# Patient Record
Sex: MALE | Race: WHITE | NOT HISPANIC OR LATINO | Employment: OTHER | URBAN - METROPOLITAN AREA
[De-identification: names, ages, dates, MRNs, and addresses within clinical notes are randomized per-mention and may not be internally consistent; named-entity substitution may affect disease eponyms.]

---

## 2023-08-14 ENCOUNTER — HOSPITAL ENCOUNTER (OUTPATIENT)
Dept: DATA CONVERSION | Facility: HOSPITAL | Age: 68
Discharge: HOME | End: 2023-08-14

## 2023-08-14 DIAGNOSIS — E78.5 HYPERLIPIDEMIA, UNSPECIFIED: ICD-10-CM

## 2023-08-14 LAB
APPEARANCE PLAS: CLEAR
CHOLEST SERPL-MCNC: 124 MG/DL (ref 133–200)
CHOLEST/HDLC SERPL: 3.4 RATIO
COLOR SPUN FLD: YELLOW
FASTING STATUS PATIENT QL REPORTED: ABNORMAL
HDLC SERPL-MCNC: 36 MG/DL
LDLC SERPL CALC-MCNC: 61 MG/DL (ref 65–130)
TRIGL SERPL-MCNC: 135 MG/DL (ref 40–150)

## 2023-09-16 PROBLEM — E78.5 HYPERLIPEMIA: Status: ACTIVE | Noted: 2023-09-16

## 2023-09-16 PROBLEM — I10 HTN (HYPERTENSION): Status: ACTIVE | Noted: 2023-09-16

## 2023-09-16 PROBLEM — I25.10 ASHD (ARTERIOSCLEROTIC HEART DISEASE): Status: ACTIVE | Noted: 2023-09-16

## 2023-09-16 RX ORDER — NITROGLYCERIN 0.4 MG/1
1 TABLET SUBLINGUAL EVERY 5 MIN PRN
COMMUNITY

## 2023-09-16 RX ORDER — ALLOPURINOL 300 MG/1
1 TABLET ORAL DAILY
COMMUNITY
End: 2023-11-23

## 2023-09-16 RX ORDER — ASCORBIC ACID 250 MG
1 TABLET,CHEWABLE ORAL DAILY
COMMUNITY

## 2023-09-16 RX ORDER — LEVOTHYROXINE SODIUM 100 UG/1
1 TABLET ORAL EVERY MORNING
COMMUNITY
End: 2023-11-23

## 2023-09-16 RX ORDER — METOPROLOL TARTRATE 25 MG/1
1 TABLET, FILM COATED ORAL 2 TIMES DAILY
COMMUNITY
End: 2023-11-02

## 2023-09-16 RX ORDER — NAPROXEN SODIUM 220 MG/1
1 TABLET, FILM COATED ORAL DAILY
COMMUNITY
Start: 2011-10-13

## 2023-09-16 RX ORDER — ATORVASTATIN CALCIUM 80 MG/1
1 TABLET, FILM COATED ORAL DAILY
COMMUNITY
End: 2023-12-22

## 2023-09-16 RX ORDER — VIT C/E/ZN/COPPR/LUTEIN/ZEAXAN 250MG-90MG
2 CAPSULE ORAL DAILY
COMMUNITY

## 2023-10-30 DIAGNOSIS — I10 BENIGN ESSENTIAL HYPERTENSION: Primary | ICD-10-CM

## 2023-11-02 RX ORDER — METOPROLOL TARTRATE 25 MG/1
25 TABLET, FILM COATED ORAL 2 TIMES DAILY
Qty: 180 TABLET | Refills: 0 | Status: SHIPPED | OUTPATIENT
Start: 2023-11-02 | End: 2024-02-12

## 2023-12-21 DIAGNOSIS — E78.2 MIXED HYPERLIPIDEMIA: Primary | ICD-10-CM

## 2023-12-22 RX ORDER — ATORVASTATIN CALCIUM 80 MG/1
80 TABLET, FILM COATED ORAL DAILY
Qty: 90 TABLET | Refills: 3 | Status: SHIPPED | OUTPATIENT
Start: 2023-12-22

## 2023-12-23 ENCOUNTER — TELEMEDICINE (OUTPATIENT)
Dept: PRIMARY CARE | Facility: CLINIC | Age: 68
End: 2023-12-23
Payer: MEDICARE

## 2023-12-23 DIAGNOSIS — J01.00 ACUTE MAXILLARY SINUSITIS, RECURRENCE NOT SPECIFIED: Primary | ICD-10-CM

## 2023-12-23 DIAGNOSIS — R53.83 OTHER FATIGUE: ICD-10-CM

## 2023-12-23 DIAGNOSIS — Z00.00 HEALTH CARE MAINTENANCE: Primary | ICD-10-CM

## 2023-12-23 DIAGNOSIS — R05.2 SUBACUTE COUGH: ICD-10-CM

## 2023-12-23 PROCEDURE — 99442 PR PHYS/QHP TELEPHONE EVALUATION 11-20 MIN: CPT | Performed by: INTERNAL MEDICINE

## 2023-12-23 RX ORDER — AZITHROMYCIN 250 MG/1
TABLET, FILM COATED ORAL
Qty: 6 TABLET | Refills: 0 | Status: SHIPPED | OUTPATIENT
Start: 2023-12-23 | End: 2023-12-28

## 2023-12-23 NOTE — PROGRESS NOTES
Subjective   Patient ID: Trent Sandhu is a 68 y.o. male who presents for No chief complaint on file..    HPI patient initiated telehealth visit this visit was completed via telephone secondary to the restrictions of the COVID-19 pandemic all medical issues were addressed and discussed with patient patient was not otherwise examined if it was felt that the patient needed to be seen in the office they would have been referred to do so patient verbally consented to visit  Sick visit has been ill for several days with intense sinus pressure drainage headache fatigue no chest pain no shortness of breath has tried multiple over-the-counter products without success    Review of Systems    Objective   There were no vitals taken for this visit.    Physical Exam alert and oriented x 3 breathing unlabored not otherwise examined    Assessment/Plan impression acute maxillary sinusitis cough fatigue  Plan discussed with pneumonia vaccination discussed with RSV vaccination good diet regular exercise increase water consumption proper rest for fatigue Tylenol as needed for any body aches and pains Mucinex DM or Coricidin for the sinus and cough then will send prescription for Zithromax 250 mg tablet take 2 tablets first day 1 for each of the next 4 days #1 traditional Z-Mirza and 0 refills continue with other medications recheck if no better and for regular visit

## 2023-12-31 NOTE — PROGRESS NOTES
Leandro Puckett MD  Physician  Primary Care     Progress Notes      Signed     Creation Time: 12/23/2023 10:33 AM     Signed       Expand All Collapse All       Subjective   Patient ID: Trent Sandhu is a 68 y.o. male who presents for No chief complaint on file..     HPI patient initiated telehealth visit this visit was completed via telephone secondary to the restrictions of the COVID-19 pandemic all medical issues were addressed and discussed with patient patient was not otherwise examined if it was felt that the patient needed to be seen in the office they would have been referred to do so patient verbally consented to visit  Sick visit has been ill for several days with intense sinus pressure drainage headache fatigue no chest pain no shortness of breath has tried multiple over-the-counter products without success     Review of Systems           Objective   There were no vitals taken for this visit.     Physical Exam alert and oriented x 3 breathing unlabored not otherwise examined           Assessment/Plan   impression acute maxillary sinusitis cough fatigue  Plan discussed with pneumonia vaccination discussed with RSV vaccination good diet regular exercise increase water consumption proper rest for fatigue Tylenol as needed for any body aches and pains Mucinex DM or Coricidin for the sinus and cough then will send prescription for Zithromax 250 mg tablet take 2 tablets first day 1 for each of the next 4 days #1 traditional Z-Mirza and 0 refills continue with other medications recheck if no better and for regular visit                        Electronically signed by Leandro Puckett MD at 12/31/2023  1:02 AM         Orders Only on 12/23/2023          Note shared with patient  Additional Documentation    Encounter Info: Billing Info,     History,     Allergies     Orders Placed    None  Medication Changes      azithromycin 250 mg Take 2 tablets (500 mg) by mouth once daily for 1 day, THEN 1 tablet (250 mg)  once daily for 4 days. Take 2 tabs (500 mg) by mouth today, than 1 daily for 4 days..  Medication List  Visit Diagnoses      Health care maintenance  Problem List

## 2024-01-19 DIAGNOSIS — Z00.00 HEALTH CARE MAINTENANCE: Primary | ICD-10-CM

## 2024-01-19 RX ORDER — AMOXICILLIN 875 MG/1
875 TABLET, FILM COATED ORAL 2 TIMES DAILY
Qty: 20 TABLET | Refills: 0 | Status: SHIPPED | OUTPATIENT
Start: 2024-01-19 | End: 2024-01-29

## 2024-01-30 ENCOUNTER — LAB (OUTPATIENT)
Dept: LAB | Facility: LAB | Age: 69
End: 2024-01-30
Payer: MEDICARE

## 2024-01-30 DIAGNOSIS — I10 ESSENTIAL (PRIMARY) HYPERTENSION: Primary | ICD-10-CM

## 2024-01-30 LAB
ANION GAP SERPL CALC-SCNC: 11 MMOL/L (ref 10–20)
BUN SERPL-MCNC: 26 MG/DL (ref 6–23)
CALCIUM SERPL-MCNC: 9 MG/DL (ref 8.6–10.3)
CHLORIDE SERPL-SCNC: 105 MMOL/L (ref 98–107)
CO2 SERPL-SCNC: 29 MMOL/L (ref 21–32)
CREAT SERPL-MCNC: 1.36 MG/DL (ref 0.5–1.3)
EGFRCR SERPLBLD CKD-EPI 2021: 56 ML/MIN/1.73M*2
GLUCOSE SERPL-MCNC: 114 MG/DL (ref 74–99)
POTASSIUM SERPL-SCNC: 3.9 MMOL/L (ref 3.5–5.3)
SODIUM SERPL-SCNC: 141 MMOL/L (ref 136–145)

## 2024-01-30 PROCEDURE — 80048 BASIC METABOLIC PNL TOTAL CA: CPT

## 2024-01-30 PROCEDURE — 36415 COLL VENOUS BLD VENIPUNCTURE: CPT

## 2024-02-12 ENCOUNTER — OFFICE VISIT (OUTPATIENT)
Dept: CARDIOLOGY | Facility: CLINIC | Age: 69
End: 2024-02-12
Payer: MEDICARE

## 2024-02-12 VITALS
DIASTOLIC BLOOD PRESSURE: 62 MMHG | BODY MASS INDEX: 24.51 KG/M2 | HEART RATE: 70 BPM | WEIGHT: 166 LBS | SYSTOLIC BLOOD PRESSURE: 132 MMHG

## 2024-02-12 DIAGNOSIS — E78.2 MIXED HYPERLIPIDEMIA: ICD-10-CM

## 2024-02-12 DIAGNOSIS — I25.10 ASHD (ARTERIOSCLEROTIC HEART DISEASE): Primary | ICD-10-CM

## 2024-02-12 DIAGNOSIS — I10 PRIMARY HYPERTENSION: ICD-10-CM

## 2024-02-12 PROCEDURE — 3078F DIAST BP <80 MM HG: CPT | Performed by: INTERNAL MEDICINE

## 2024-02-12 PROCEDURE — 99214 OFFICE O/P EST MOD 30 MIN: CPT | Mod: 25,27 | Performed by: INTERNAL MEDICINE

## 2024-02-12 PROCEDURE — 1159F MED LIST DOCD IN RCRD: CPT | Performed by: INTERNAL MEDICINE

## 2024-02-12 PROCEDURE — 1036F TOBACCO NON-USER: CPT | Performed by: INTERNAL MEDICINE

## 2024-02-12 PROCEDURE — 3075F SYST BP GE 130 - 139MM HG: CPT | Performed by: INTERNAL MEDICINE

## 2024-02-12 PROCEDURE — 1126F AMNT PAIN NOTED NONE PRSNT: CPT | Performed by: INTERNAL MEDICINE

## 2024-02-12 PROCEDURE — 99214 OFFICE O/P EST MOD 30 MIN: CPT | Performed by: INTERNAL MEDICINE

## 2024-02-12 RX ORDER — AMLODIPINE BESYLATE 5 MG/1
5 TABLET ORAL DAILY
Qty: 90 TABLET | Refills: 3 | Status: SHIPPED | OUTPATIENT
Start: 2024-02-12 | End: 2024-02-19 | Stop reason: SDUPTHER

## 2024-02-12 ASSESSMENT — ENCOUNTER SYMPTOMS
NUMBNESS: 0
ABDOMINAL PAIN: 0
COUGH: 0
DYSURIA: 0
OCCASIONAL FEELINGS OF UNSTEADINESS: 0
PARESTHESIAS: 0
BLURRED VISION: 0
DYSPNEA ON EXERTION: 0
PALPITATIONS: 0
DEPRESSION: 0
SHORTNESS OF BREATH: 0
LOSS OF SENSATION IN FEET: 0
HEMATURIA: 0

## 2024-02-12 ASSESSMENT — PAIN SCALES - GENERAL: PAINLEVEL: 0-NO PAIN

## 2024-02-12 NOTE — PROGRESS NOTES
Subjective   Trent Sandhu is a 69 y.o. male.    Chief Complaint:  No chief complaint on file.    HPI  Clinically patient doing very well with no chest pain or anginal symptoms.  Remains physically active walking almost 3 miles per day.    Review of Systems   Constitutional: Negative for malaise/fatigue.   HENT:  Negative for congestion.    Eyes:  Negative for blurred vision.   Cardiovascular:  Negative for chest pain, dyspnea on exertion and palpitations.   Respiratory:  Negative for cough and shortness of breath.    Musculoskeletal:  Negative for joint pain.   Gastrointestinal:  Negative for abdominal pain.   Genitourinary:  Negative for dysuria and hematuria.   Neurological:  Negative for numbness and paresthesias.       Objective   Constitutional:       Appearance: Not in distress.   Eyes:      Conjunctiva/sclera: Conjunctivae normal.   Neck:      Vascular: JVD normal.   Pulmonary:      Breath sounds: Normal breath sounds. No wheezing. No rhonchi. No rales.   Cardiovascular:      Normal rate. Regular rhythm.      Murmurs: There is no murmur.      No gallop.  No click. No rub.   Abdominal:      Palpations: Abdomen is soft.   Neurological:      General: No focal deficit present.      Mental Status: Alert.         Lab Review:   Lab on 01/30/2024   Component Date Value    Glucose 01/30/2024 114 (H)     Sodium 01/30/2024 141     Potassium 01/30/2024 3.9     Chloride 01/30/2024 105     Bicarbonate 01/30/2024 29     Anion Gap 01/30/2024 11     Urea Nitrogen 01/30/2024 26 (H)     Creatinine 01/30/2024 1.36 (H)     eGFR 01/30/2024 56 (L)     Calcium 01/30/2024 9.0        Assessment/Plan   The primary encounter diagnosis was ASHD (arteriosclerotic heart disease). Diagnoses of Primary hypertension and Mixed hyperlipidemia were also pertinent to this visit.    Hyperlipemia  Last LDL was 61, very good.  Plan on repeating lipid panel on return.    HTN (hypertension)  Reviewed BMP from January:  K+ 3.9 Creat 1.36 (GFR 56)  Glucose 114    ASHD (arteriosclerotic heart disease)  Stable with no anginal symptoms, will follow clinically, doing well.

## 2024-02-19 ENCOUNTER — OFFICE VISIT (OUTPATIENT)
Dept: PRIMARY CARE | Facility: CLINIC | Age: 69
End: 2024-02-19
Payer: MEDICARE

## 2024-02-19 ENCOUNTER — TELEPHONE (OUTPATIENT)
Dept: PRIMARY CARE | Facility: CLINIC | Age: 69
End: 2024-02-19

## 2024-02-19 VITALS — SYSTOLIC BLOOD PRESSURE: 135 MMHG | DIASTOLIC BLOOD PRESSURE: 74 MMHG

## 2024-02-19 DIAGNOSIS — M10.9 GOUT, UNSPECIFIED CAUSE, UNSPECIFIED CHRONICITY, UNSPECIFIED SITE: ICD-10-CM

## 2024-02-19 DIAGNOSIS — J06.9 UPPER RESPIRATORY TRACT INFECTION, UNSPECIFIED TYPE: ICD-10-CM

## 2024-02-19 DIAGNOSIS — R73.02 GLUCOSE INTOLERANCE (IMPAIRED GLUCOSE TOLERANCE): ICD-10-CM

## 2024-02-19 DIAGNOSIS — I10 PRIMARY HYPERTENSION: ICD-10-CM

## 2024-02-19 DIAGNOSIS — I10 PRIMARY HYPERTENSION: Primary | ICD-10-CM

## 2024-02-19 PROCEDURE — 1036F TOBACCO NON-USER: CPT | Performed by: INTERNAL MEDICINE

## 2024-02-19 PROCEDURE — 1126F AMNT PAIN NOTED NONE PRSNT: CPT | Performed by: INTERNAL MEDICINE

## 2024-02-19 PROCEDURE — 3075F SYST BP GE 130 - 139MM HG: CPT | Performed by: INTERNAL MEDICINE

## 2024-02-19 PROCEDURE — 99214 OFFICE O/P EST MOD 30 MIN: CPT | Performed by: INTERNAL MEDICINE

## 2024-02-19 PROCEDURE — 1159F MED LIST DOCD IN RCRD: CPT | Performed by: INTERNAL MEDICINE

## 2024-02-19 PROCEDURE — 3078F DIAST BP <80 MM HG: CPT | Performed by: INTERNAL MEDICINE

## 2024-02-19 RX ORDER — AMLODIPINE BESYLATE 5 MG/1
5 TABLET ORAL DAILY
Qty: 90 TABLET | Refills: 3 | Status: SHIPPED | OUTPATIENT
Start: 2024-02-19 | End: 2024-06-03 | Stop reason: DRUGHIGH

## 2024-02-19 NOTE — PROGRESS NOTES
Subjective   Patient ID: Trent Sandhu is a 69 y.o. male who presents for No chief complaint on file..    HPI Follow-up visit and sick visit no chest pain or shortness of breath has been back-and-forth with some respiratory tract infections has been on several courses of antibiotics continues to be managed.  Needs pain shoulder  Recurrent fever beta-blocker changed to amlodipine.  His blood pressure he had some blood work done uric acid level we will go back to go off      The visit has not had gout glucose 114 creatinine 1.6 with Dr. Redman    Review of Systems    Objective   There were no vitals taken for this visit.    Physical Exam  Vital signs noted alert and oriented x 3 NCAT no coryza there is some erythema around the eye socket on the left prosthetic he is on drops and side of for that with the ophthalmologist nares without discharge OP benign TM normal bilateral EAC clear bilateral no AC nodes no JVD chest clear to auscultation CV regular rate and rhythm S1-S2 abdomen soft nontender normal active bowel sounds extremities no clubbing cyanosis or edema normal pulses  Assessment/Plan    impression hypertension glucose intolerance mild renal insufficiency continued upper respiratory tract type infection other diagnoses gout  Plan May go off the allopurinol should be show choose continue to watch diet for uric acid and serum allopurinol at any time or treat gout accordingly may recheck uric acid continue with good better water consumption reevaluate the blood sugar elevated creatinine after change of medications diet watching urinc acid and cho good exercise and water consumption follow-up with Dr. Ortega.  Cardiology for the upper respiratory tract infection with the use of Coricidin Tylenol as needed avoidance of NSAIDs no further antibiotics are needed and recheck for regular visit and based on above  tt40 cc21

## 2024-05-02 DIAGNOSIS — Z00.00 HEALTH CARE MAINTENANCE: ICD-10-CM

## 2024-05-02 RX ORDER — ALLOPURINOL 300 MG/1
300 TABLET ORAL DAILY
Qty: 90 TABLET | Refills: 0 | Status: SHIPPED | OUTPATIENT
Start: 2024-05-02

## 2024-05-02 RX ORDER — LEVOTHYROXINE SODIUM 100 UG/1
100 TABLET ORAL DAILY
Qty: 90 TABLET | Refills: 0 | Status: SHIPPED | OUTPATIENT
Start: 2024-05-02

## 2024-05-21 ENCOUNTER — OFFICE VISIT (OUTPATIENT)
Dept: PRIMARY CARE | Facility: CLINIC | Age: 69
End: 2024-05-21
Payer: MEDICARE

## 2024-05-21 VITALS — DIASTOLIC BLOOD PRESSURE: 71 MMHG | SYSTOLIC BLOOD PRESSURE: 131 MMHG

## 2024-05-21 DIAGNOSIS — N64.4 NIPPLE TENDERNESS: ICD-10-CM

## 2024-05-21 DIAGNOSIS — I10 PRIMARY HYPERTENSION: Primary | ICD-10-CM

## 2024-05-21 PROCEDURE — 99213 OFFICE O/P EST LOW 20 MIN: CPT | Performed by: INTERNAL MEDICINE

## 2024-05-21 PROCEDURE — 3075F SYST BP GE 130 - 139MM HG: CPT | Performed by: INTERNAL MEDICINE

## 2024-05-21 PROCEDURE — 3078F DIAST BP <80 MM HG: CPT | Performed by: INTERNAL MEDICINE

## 2024-05-21 PROCEDURE — 1159F MED LIST DOCD IN RCRD: CPT | Performed by: INTERNAL MEDICINE

## 2024-05-21 NOTE — PROGRESS NOTES
Subjective   Patient ID: Trent Sandhu is a 69 y.o. male who presents for No chief complaint on file..    HPI follow-up visit and sick visit no chest pain no shortness of breath no side effect with medication was changed to amlodipine from metoprolol with his cardiologist and just in the past few weeks that we noticed some nipple sensitivity without breast enhancement no other dietary or medication change both prescription and over-the-counter    Review of Systems    Objective   There were no vitals taken for this visit.    Physical Exam vital signs noted alert and oriented x 3 NCAT no lid lag no JVD no thyromegaly chest clear to auscultation no gynecomastia nipples appear to be normal no enhanced redness CV regular rate and rhythm S1-S2 extremities no clubbing cyanosis or edema normal distal pulses    Assessment/Plan impression hypertension nipple sensitivity  Plan he will wait 1 month and then recheck on testosterone or thyroid or run search on amlodipine as a potential cause and then follow-up with his cardiologist and refer to endocrinology if needed otherwise good diet regular exercise continue with blood pressure management and medication and follow-up for regular visit    Run search on amlodipine and gynecomastia or nipple sensitivity (?)

## 2024-05-24 ENCOUNTER — TELEPHONE (OUTPATIENT)
Dept: PRIMARY CARE | Facility: CLINIC | Age: 69
End: 2024-05-24

## 2024-05-24 DIAGNOSIS — N64.4 NIPPLE TENDERNESS: ICD-10-CM

## 2024-05-24 DIAGNOSIS — E03.9 HYPOTHYROIDISM, UNSPECIFIED TYPE: Primary | ICD-10-CM

## 2024-05-25 ENCOUNTER — LAB (OUTPATIENT)
Dept: LAB | Facility: LAB | Age: 69
End: 2024-05-25
Payer: MEDICARE

## 2024-05-25 DIAGNOSIS — E03.9 HYPOTHYROIDISM, UNSPECIFIED TYPE: ICD-10-CM

## 2024-05-25 DIAGNOSIS — N64.4 NIPPLE TENDERNESS: ICD-10-CM

## 2024-05-25 LAB — TSH SERPL-ACNC: 2.81 MIU/L (ref 0.44–3.98)

## 2024-05-25 PROCEDURE — 36415 COLL VENOUS BLD VENIPUNCTURE: CPT

## 2024-05-25 PROCEDURE — 84402 ASSAY OF FREE TESTOSTERONE: CPT

## 2024-05-25 PROCEDURE — 84443 ASSAY THYROID STIM HORMONE: CPT

## 2024-05-31 LAB
TESTOSTERONE FREE (CHAN): 46.3 PG/ML (ref 30–135)
TESTOSTERONE,TOTAL,LC-MS/MS: 445 NG/DL (ref 250–1100)

## 2024-06-03 ENCOUNTER — TELEPHONE (OUTPATIENT)
Dept: CARDIOLOGY | Facility: CLINIC | Age: 69
End: 2024-06-03
Payer: MEDICARE

## 2024-06-03 DIAGNOSIS — I10 PRIMARY HYPERTENSION: Primary | ICD-10-CM

## 2024-06-03 RX ORDER — AMLODIPINE BESYLATE 10 MG/1
10 TABLET ORAL DAILY
Qty: 90 TABLET | Refills: 3 | Status: SHIPPED | OUTPATIENT
Start: 2024-06-03 | End: 2025-06-03

## 2024-06-03 NOTE — PROGRESS NOTES
With higher pressures will increase the Amlodipine to 10 mg daily.  He has also purchased a new BP monitor, I asked him to bring it in on next visit.

## 2024-06-14 ENCOUNTER — HOSPITAL ENCOUNTER (OUTPATIENT)
Dept: RADIOLOGY | Facility: CLINIC | Age: 69
Discharge: HOME | End: 2024-06-14
Payer: MEDICARE

## 2024-06-14 ENCOUNTER — APPOINTMENT (OUTPATIENT)
Dept: PRIMARY CARE | Facility: CLINIC | Age: 69
End: 2024-06-14
Payer: MEDICARE

## 2024-06-14 VITALS — SYSTOLIC BLOOD PRESSURE: 140 MMHG | DIASTOLIC BLOOD PRESSURE: 75 MMHG

## 2024-06-14 DIAGNOSIS — R07.9 RIGHT-SIDED CHEST PAIN: Primary | ICD-10-CM

## 2024-06-14 DIAGNOSIS — E03.9 HYPOTHYROIDISM, UNSPECIFIED TYPE: ICD-10-CM

## 2024-06-14 DIAGNOSIS — R07.9 RIGHT-SIDED CHEST PAIN: ICD-10-CM

## 2024-06-14 PROCEDURE — 71046 X-RAY EXAM CHEST 2 VIEWS: CPT | Performed by: RADIOLOGY

## 2024-06-14 PROCEDURE — 99213 OFFICE O/P EST LOW 20 MIN: CPT | Performed by: INTERNAL MEDICINE

## 2024-06-14 PROCEDURE — 3077F SYST BP >= 140 MM HG: CPT | Performed by: INTERNAL MEDICINE

## 2024-06-14 PROCEDURE — 3078F DIAST BP <80 MM HG: CPT | Performed by: INTERNAL MEDICINE

## 2024-06-14 PROCEDURE — 71046 X-RAY EXAM CHEST 2 VIEWS: CPT

## 2024-06-14 NOTE — PROGRESS NOTES
Subjective   Patient ID: Trent Sandhu is a 69 y.o. male who presents for No chief complaint on file..    HPI follow-up visit no chest pain no shortness of breath had blood work regarding his right-sided at the time thought breast discomfort but really is chest wall discomfort mostly when he goes to give a hug and the chest wall is pressed happens whether it is his wife or grandchild has been lifting 5 gallon water jugs for exercise but does not fill them all the way to the top    Review of Systems    Objective   /75     Physical Exam vital signs noted alert and oriented x 3 NCAT no lid lag no thyromegaly no JVD or bruit chest clear to auscultation CV regular rate and rhythm S1-S2 chest wall nontender no visible markings normal nipple normal shoulder normal neck normal bicep normal tricep normal handgrip on that side extremities no clubbing cyanosis or edema normal distal pulses dtr 2+  Assessment/Plan impression chest wall pain on right other diagnoses hypothyroid  Plan blood pressure is okay on the amlodipine will obtain chest x-ray PA lateral requisition made whether pinched nerve or not and then advised on analgesia he will call back regarding his films and then review overall diet and exercise regarding blood pressure The testosterone level was reviewed and was normal the thyroid test was reviewed and was normal in May continue on the same dosing of the levothyroxine medication and follow-up based on above

## 2024-07-17 ENCOUNTER — OFFICE VISIT (OUTPATIENT)
Dept: PRIMARY CARE | Facility: CLINIC | Age: 69
End: 2024-07-17
Payer: MEDICARE

## 2024-07-17 VITALS — DIASTOLIC BLOOD PRESSURE: 76 MMHG | SYSTOLIC BLOOD PRESSURE: 123 MMHG

## 2024-07-17 DIAGNOSIS — R60.9 PERIPHERAL EDEMA: ICD-10-CM

## 2024-07-17 DIAGNOSIS — I10 PRIMARY HYPERTENSION: Primary | ICD-10-CM

## 2024-07-17 PROCEDURE — 3074F SYST BP LT 130 MM HG: CPT | Performed by: INTERNAL MEDICINE

## 2024-07-17 PROCEDURE — 3078F DIAST BP <80 MM HG: CPT | Performed by: INTERNAL MEDICINE

## 2024-07-17 PROCEDURE — 99213 OFFICE O/P EST LOW 20 MIN: CPT | Performed by: INTERNAL MEDICINE

## 2024-07-18 DIAGNOSIS — Z00.00 HEALTH CARE MAINTENANCE: ICD-10-CM

## 2024-07-18 RX ORDER — ALLOPURINOL 300 MG/1
300 TABLET ORAL DAILY
Qty: 90 TABLET | Refills: 0 | Status: SHIPPED | OUTPATIENT
Start: 2024-07-18

## 2024-07-18 RX ORDER — LEVOTHYROXINE SODIUM 100 UG/1
100 TABLET ORAL DAILY
Qty: 90 TABLET | Refills: 0 | Status: SHIPPED | OUTPATIENT
Start: 2024-07-18

## 2024-07-18 NOTE — PROGRESS NOTES
Subjective   Patient ID: Trent Sandhu is a 69 y.o. male who presents for No chief complaint on file..    HPI  Sick visit same day after hours no staff  No chest pain no shortness of breath no side effect with medication follow-up with  Some left leg slight swelling and foot. 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    Physical Exam vital signs noted alert oriented x 3 NCAT no JVD or bruit chest clear to auscultation CV regular rate and rhythm s1s2 s mgr  Extremities no clubbing cyanosis or edema on the right mild non pitting left with mild swelling tightness to the achilles she is not ankle 2 cm larger than right side intact distal pulses    Assessment/Plan  impression htn peripheral edema other diagnosis   Plan watch salt in diet elevate legs advil prn if needed for pain and/or swelling  Reviewed laboratory resuls (check)  Regular exercise  Good shoe wear  Continue with bp medicine  Follow up with cardiology as before  Recheck if not beter and for regular visit

## 2024-07-22 ENCOUNTER — TELEPHONE (OUTPATIENT)
Dept: CARDIOLOGY | Facility: CLINIC | Age: 69
End: 2024-07-22

## 2024-07-22 NOTE — TELEPHONE ENCOUNTER
Patient states he has had swelling in his ankles over the weekend left greater than right.  Better today but unusual for him.  He states he is taking amlodipine unknown milligram strength twice daily.  I asked him to decrease the amlodipine to once daily and to check home blood pressures.  If swelling does not resolve or blood pressures are not well-controlled then to call back.

## 2024-07-22 NOTE — TELEPHONE ENCOUNTER
Pt called the office concern about swelling dieudonne legs mainly left leg over the weekend  Pt stated he also notice a rash above the ankle.  Pt calf muscle is tender to touch   Please advice   Good call back number   [phone]  No further symptoms at this time

## 2024-07-29 ENCOUNTER — TELEPHONE (OUTPATIENT)
Dept: CARDIOLOGY | Facility: CLINIC | Age: 69
End: 2024-07-29
Payer: MEDICARE

## 2024-07-29 DIAGNOSIS — I10 PRIMARY HYPERTENSION: Primary | ICD-10-CM

## 2024-07-29 RX ORDER — METOPROLOL TARTRATE 25 MG/1
25 TABLET, FILM COATED ORAL 2 TIMES DAILY
Qty: 180 TABLET | Refills: 3 | Status: SHIPPED | OUTPATIENT
Start: 2024-07-29 | End: 2025-07-29

## 2024-07-29 NOTE — PROGRESS NOTES
Leg swelling with the Amlodipine.  Discontinue amlodipine and go back to the metoprolol tartrate 25 mg twice daily as alternative agent.  Discussed with patient over the phone.

## 2024-08-01 ENCOUNTER — TELEPHONE (OUTPATIENT)
Dept: CARDIOLOGY | Facility: CLINIC | Age: 69
End: 2024-08-01
Payer: MEDICARE

## 2024-08-16 ENCOUNTER — LAB (OUTPATIENT)
Dept: LAB | Facility: LAB | Age: 69
End: 2024-08-16
Payer: MEDICARE

## 2024-08-16 DIAGNOSIS — I10 PRIMARY HYPERTENSION: ICD-10-CM

## 2024-08-16 DIAGNOSIS — E78.2 MIXED HYPERLIPIDEMIA: ICD-10-CM

## 2024-08-16 LAB
ANION GAP SERPL CALC-SCNC: 10 MMOL/L (ref 10–20)
BUN SERPL-MCNC: 23 MG/DL (ref 6–23)
CALCIUM SERPL-MCNC: 9 MG/DL (ref 8.6–10.3)
CHLORIDE SERPL-SCNC: 105 MMOL/L (ref 98–107)
CHOLEST SERPL-MCNC: 104 MG/DL (ref 0–199)
CHOLESTEROL/HDL RATIO: 3
CO2 SERPL-SCNC: 29 MMOL/L (ref 21–32)
CREAT SERPL-MCNC: 1.56 MG/DL (ref 0.5–1.3)
EGFRCR SERPLBLD CKD-EPI 2021: 47 ML/MIN/1.73M*2
GLUCOSE SERPL-MCNC: 117 MG/DL (ref 74–99)
HDLC SERPL-MCNC: 34.1 MG/DL
LDLC SERPL CALC-MCNC: 43 MG/DL
NON HDL CHOLESTEROL: 70 MG/DL (ref 0–149)
POTASSIUM SERPL-SCNC: 3.7 MMOL/L (ref 3.5–5.3)
SODIUM SERPL-SCNC: 140 MMOL/L (ref 136–145)
TRIGL SERPL-MCNC: 137 MG/DL (ref 0–149)
VLDL: 27 MG/DL (ref 0–40)

## 2024-08-16 PROCEDURE — 80048 BASIC METABOLIC PNL TOTAL CA: CPT

## 2024-08-16 PROCEDURE — 80061 LIPID PANEL: CPT

## 2024-08-16 PROCEDURE — 36415 COLL VENOUS BLD VENIPUNCTURE: CPT

## 2024-08-19 DIAGNOSIS — I10 PRIMARY HYPERTENSION: Primary | ICD-10-CM

## 2024-08-19 NOTE — PROGRESS NOTES
Patient contacted about Creat increasing to 1.5.  States he feels good.  He did get stung by bees last week and had been taking ibuprofen.  Instructed to stop ibuprofen, hydrate, recheck BMP on Thursday and followup with me on Tuesday of following week.

## 2024-08-21 NOTE — ASSESSMENT & PLAN NOTE
BMP: Na+ 140 K+ 3.7 Creat 1.56 (GFR 47).  I called patient on phone and recommended fluid hydration, recheck BMP and possible nephrology referral.  Blood pressure does remain elevated off of medications.  Will restart metoprolol to tartrate 25 mg twice daily.  Will refer to Dr. Paniagua for nephrology evaluation.  Will bring the patient back for a recheck in approximately 4 weeks.

## 2024-08-22 ENCOUNTER — LAB (OUTPATIENT)
Dept: LAB | Facility: LAB | Age: 69
End: 2024-08-22
Payer: MEDICARE

## 2024-08-22 DIAGNOSIS — I10 PRIMARY HYPERTENSION: ICD-10-CM

## 2024-08-22 LAB
ANION GAP SERPL CALC-SCNC: 14 MMOL/L (ref 10–20)
BUN SERPL-MCNC: 19 MG/DL (ref 6–23)
CALCIUM SERPL-MCNC: 9 MG/DL (ref 8.6–10.3)
CHLORIDE SERPL-SCNC: 107 MMOL/L (ref 98–107)
CO2 SERPL-SCNC: 25 MMOL/L (ref 21–32)
CREAT SERPL-MCNC: 2 MG/DL (ref 0.5–1.3)
EGFRCR SERPLBLD CKD-EPI 2021: 35 ML/MIN/1.73M*2
GLUCOSE SERPL-MCNC: 105 MG/DL (ref 74–99)
POTASSIUM SERPL-SCNC: 3.9 MMOL/L (ref 3.5–5.3)
SODIUM SERPL-SCNC: 142 MMOL/L (ref 136–145)

## 2024-08-22 PROCEDURE — 80048 BASIC METABOLIC PNL TOTAL CA: CPT

## 2024-08-22 PROCEDURE — 36415 COLL VENOUS BLD VENIPUNCTURE: CPT

## 2024-08-27 ENCOUNTER — OFFICE VISIT (OUTPATIENT)
Dept: CARDIOLOGY | Facility: CLINIC | Age: 69
End: 2024-08-27
Payer: MEDICARE

## 2024-08-27 VITALS
WEIGHT: 165 LBS | HEART RATE: 72 BPM | BODY MASS INDEX: 24.37 KG/M2 | SYSTOLIC BLOOD PRESSURE: 158 MMHG | DIASTOLIC BLOOD PRESSURE: 74 MMHG

## 2024-08-27 DIAGNOSIS — E78.2 MIXED HYPERLIPIDEMIA: ICD-10-CM

## 2024-08-27 DIAGNOSIS — I25.10 ASHD (ARTERIOSCLEROTIC HEART DISEASE): Primary | ICD-10-CM

## 2024-08-27 DIAGNOSIS — I11.0 CONGESTIVE HEART FAILURE DUE TO HYPERTENSION (MULTI): ICD-10-CM

## 2024-08-27 DIAGNOSIS — I10 PRIMARY HYPERTENSION: ICD-10-CM

## 2024-08-27 PROCEDURE — 3078F DIAST BP <80 MM HG: CPT | Performed by: INTERNAL MEDICINE

## 2024-08-27 PROCEDURE — 1159F MED LIST DOCD IN RCRD: CPT | Performed by: INTERNAL MEDICINE

## 2024-08-27 PROCEDURE — 99214 OFFICE O/P EST MOD 30 MIN: CPT | Performed by: INTERNAL MEDICINE

## 2024-08-27 PROCEDURE — 1036F TOBACCO NON-USER: CPT | Performed by: INTERNAL MEDICINE

## 2024-08-27 PROCEDURE — 3077F SYST BP >= 140 MM HG: CPT | Performed by: INTERNAL MEDICINE

## 2024-08-27 PROCEDURE — 1126F AMNT PAIN NOTED NONE PRSNT: CPT | Performed by: INTERNAL MEDICINE

## 2024-08-27 ASSESSMENT — PATIENT HEALTH QUESTIONNAIRE - PHQ9
2. FEELING DOWN, DEPRESSED OR HOPELESS: NOT AT ALL
1. LITTLE INTEREST OR PLEASURE IN DOING THINGS: NOT AT ALL
SUM OF ALL RESPONSES TO PHQ9 QUESTIONS 1 AND 2: 0

## 2024-08-27 ASSESSMENT — ENCOUNTER SYMPTOMS
COUGH: 0
DYSPNEA ON EXERTION: 0
NUMBNESS: 0
SHORTNESS OF BREATH: 0
HEMATURIA: 0
PARESTHESIAS: 0
BLURRED VISION: 0
PALPITATIONS: 0
DYSURIA: 0
ABDOMINAL PAIN: 0

## 2024-08-27 ASSESSMENT — PAIN SCALES - GENERAL: PAINLEVEL: 0-NO PAIN

## 2024-08-27 NOTE — ASSESSMENT & PLAN NOTE
Stable with no anginal type symptoms.  Continue standard risk factor modification and follow on a clinical basis.

## 2024-08-27 NOTE — PROGRESS NOTES
Subjective   Trent Sandhu is a 69 y.o. male.    Chief Complaint:  Follow-up (6 month)    HPI  No chest pain or anginal type symptoms.  Swelling did decrease with diuretic therapy but lab test revealed worsening kidney function.  Medications were discontinued and he returns today for routine evaluation.    Review of Systems   Constitutional: Negative for malaise/fatigue.   HENT:  Negative for congestion.    Eyes:  Negative for blurred vision.   Cardiovascular:  Negative for chest pain, dyspnea on exertion and palpitations.   Respiratory:  Negative for cough and shortness of breath.    Musculoskeletal:  Negative for joint pain.   Gastrointestinal:  Negative for abdominal pain.   Genitourinary:  Negative for dysuria and hematuria.   Neurological:  Negative for numbness and paresthesias.       Objective   Constitutional:       Appearance: Not in distress.   Eyes:      Conjunctiva/sclera: Conjunctivae normal.   Neck:      Vascular: JVD normal.   Pulmonary:      Breath sounds: Normal breath sounds. No wheezing. No rhonchi. No rales.   Cardiovascular:      Normal rate. Regular rhythm.      Murmurs: There is no murmur.      No gallop.  No click. No rub.   Abdominal:      Palpations: Abdomen is soft.   Neurological:      General: No focal deficit present.      Mental Status: Alert.         Lab Review:   Lab on 08/22/2024   Component Date Value    Glucose 08/22/2024 105 (H)     Sodium 08/22/2024 142     Potassium 08/22/2024 3.9     Chloride 08/22/2024 107     Bicarbonate 08/22/2024 25     Anion Gap 08/22/2024 14     Urea Nitrogen 08/22/2024 19     Creatinine 08/22/2024 2.00 (H)     eGFR 08/22/2024 35 (L)     Calcium 08/22/2024 9.0    Lab on 08/16/2024   Component Date Value    Cholesterol 08/16/2024 104     HDL-Cholesterol 08/16/2024 34.1     Cholesterol/HDL Ratio 08/16/2024 3.0     LDL Calculated 08/16/2024 43     VLDL 08/16/2024 27     Triglycerides 08/16/2024 137     Non HDL Cholesterol 08/16/2024 70     Glucose 08/16/2024  117 (H)     Sodium 08/16/2024 140     Potassium 08/16/2024 3.7     Chloride 08/16/2024 105     Bicarbonate 08/16/2024 29     Anion Gap 08/16/2024 10     Urea Nitrogen 08/16/2024 23     Creatinine 08/16/2024 1.56 (H)     eGFR 08/16/2024 47 (L)     Calcium 08/16/2024 9.0        Assessment/Plan   The primary encounter diagnosis was ASHD (arteriosclerotic heart disease). Diagnoses of Primary hypertension and Mixed hyperlipidemia were also pertinent to this visit.    HTN (hypertension)  BMP: Na+ 140 K+ 3.7 Creat 1.56 (GFR 47).  I called patient on phone and recommended fluid hydration, recheck BMP and possible nephrology referral.  Blood pressure does remain elevated off of medications.  Will restart metoprolol to tartrate 25 mg twice daily.  Will refer to Dr. Paniagua for nephrology evaluation.  Will bring the patient back for a recheck in approximately 4 weeks.    Hyperlipemia  Reviewed lipid panel: Tchol 104  HDL 34 LDL 43, very good.    ASHD (arteriosclerotic heart disease)  Stable with no anginal type symptoms.  Continue standard risk factor modification and follow on a clinical basis.

## 2024-09-03 ENCOUNTER — TELEPHONE (OUTPATIENT)
Dept: CARDIOLOGY | Facility: CLINIC | Age: 69
End: 2024-09-03
Payer: MEDICARE

## 2024-09-03 NOTE — TELEPHONE ENCOUNTER
Patient called stating that you had referred him to nephrology.  He is asking if he should have any blood testing before he goes to his nephrology appointment?  Please advise, thanks  Call back [phone]

## 2024-09-03 NOTE — TELEPHONE ENCOUNTER
I called patient and discussed with him.  Stated we should wait till he sees Dr. Paniagua and have laboratory studies done at that time.

## 2024-09-11 ENCOUNTER — HOSPITAL ENCOUNTER (OUTPATIENT)
Dept: CARDIOLOGY | Facility: HOSPITAL | Age: 69
Discharge: HOME | End: 2024-09-11
Payer: MEDICARE

## 2024-09-11 DIAGNOSIS — I10 PRIMARY HYPERTENSION: ICD-10-CM

## 2024-09-11 DIAGNOSIS — I11.0 CONGESTIVE HEART FAILURE DUE TO HYPERTENSION (MULTI): ICD-10-CM

## 2024-09-11 LAB
AORTIC VALVE MEAN GRADIENT: 1 MMHG
AORTIC VALVE PEAK VELOCITY: 0.82 M/S
AV PEAK GRADIENT: 2.7 MMHG
AVA (PEAK VEL): 3.05 CM2
AVA (VTI): 2.63 CM2
EJECTION FRACTION APICAL 4 CHAMBER: 55.4
EJECTION FRACTION: 58 %
LEFT VENTRICLE INTERNAL DIMENSION DIASTOLE: 4.14 CM (ref 3.5–6)
LEFT VENTRICULAR OUTFLOW TRACT DIAMETER: 2 CM
LV EJECTION FRACTION BIPLANE: 55 %
MITRAL VALVE E/A RATIO: 1.18
RIGHT VENTRICLE FREE WALL PEAK S': 10.2 CM/S
RIGHT VENTRICLE PEAK SYSTOLIC PRESSURE: 27.2 MMHG
TRICUSPID ANNULAR PLANE SYSTOLIC EXCURSION: 2 CM

## 2024-09-11 PROCEDURE — 93306 TTE W/DOPPLER COMPLETE: CPT

## 2024-09-11 PROCEDURE — 93306 TTE W/DOPPLER COMPLETE: CPT | Performed by: INTERNAL MEDICINE

## 2024-09-20 ENCOUNTER — HOSPITAL ENCOUNTER (OUTPATIENT)
Dept: RADIOLOGY | Facility: HOSPITAL | Age: 69
Discharge: HOME | End: 2024-09-20
Payer: MEDICARE

## 2024-09-20 ENCOUNTER — LAB (OUTPATIENT)
Dept: LAB | Facility: LAB | Age: 69
End: 2024-09-20
Payer: MEDICARE

## 2024-09-20 DIAGNOSIS — N18.32 CHRONIC KIDNEY DISEASE, STAGE 3B (MULTI): ICD-10-CM

## 2024-09-20 DIAGNOSIS — N18.32 CHRONIC KIDNEY DISEASE, STAGE 3B (MULTI): Primary | ICD-10-CM

## 2024-09-20 LAB
25(OH)D3 SERPL-MCNC: 48 NG/ML (ref 30–100)
ALBUMIN SERPL BCP-MCNC: 4.2 G/DL (ref 3.4–5)
ANION GAP SERPL CALC-SCNC: 14 MMOL/L (ref 10–20)
APPEARANCE UR: CLEAR
BILIRUB UR STRIP.AUTO-MCNC: NEGATIVE MG/DL
BUN SERPL-MCNC: 39 MG/DL (ref 6–23)
C3 SERPL-MCNC: 160 MG/DL (ref 87–200)
C4 SERPL-MCNC: 57 MG/DL (ref 10–50)
CALCIUM SERPL-MCNC: 8.9 MG/DL (ref 8.6–10.3)
CHLORIDE SERPL-SCNC: 105 MMOL/L (ref 98–107)
CO2 SERPL-SCNC: 26 MMOL/L (ref 21–32)
COLOR UR: COLORLESS
CREAT SERPL-MCNC: 2.93 MG/DL (ref 0.5–1.3)
EGFRCR SERPLBLD CKD-EPI 2021: 22 ML/MIN/1.73M*2
ERYTHROCYTE [DISTWIDTH] IN BLOOD BY AUTOMATED COUNT: 13.6 % (ref 11.5–14.5)
GLUCOSE SERPL-MCNC: 133 MG/DL (ref 74–99)
GLUCOSE UR STRIP.AUTO-MCNC: NORMAL MG/DL
HAV IGM SER QL: NONREACTIVE
HBV CORE IGM SER QL: NONREACTIVE
HBV SURFACE AG SERPL QL IA: NONREACTIVE
HCT VFR BLD AUTO: 36.2 % (ref 41–52)
HCV AB SER QL: NONREACTIVE
HGB BLD-MCNC: 11.8 G/DL (ref 13.5–17.5)
KETONES UR STRIP.AUTO-MCNC: NEGATIVE MG/DL
LEUKOCYTE ESTERASE UR QL STRIP.AUTO: NEGATIVE
MCH RBC QN AUTO: 31.8 PG (ref 26–34)
MCHC RBC AUTO-ENTMCNC: 32.6 G/DL (ref 32–36)
MCV RBC AUTO: 98 FL (ref 80–100)
NITRITE UR QL STRIP.AUTO: NEGATIVE
NRBC BLD-RTO: 0 /100 WBCS (ref 0–0)
PH UR STRIP.AUTO: 5 [PH]
PHOSPHATE SERPL-MCNC: 4.3 MG/DL (ref 2.5–4.9)
PLATELET # BLD AUTO: 147 X10*3/UL (ref 150–450)
POTASSIUM SERPL-SCNC: 4.1 MMOL/L (ref 3.5–5.3)
PROT SERPL-MCNC: 6.8 G/DL (ref 6.4–8.2)
PROT UR STRIP.AUTO-MCNC: NEGATIVE MG/DL
PROT UR-ACNC: 6 MG/DL (ref 5–25)
PTH-INTACT SERPL-MCNC: 219.7 PG/ML (ref 18.5–88)
RBC # BLD AUTO: 3.71 X10*6/UL (ref 4.5–5.9)
RBC # UR STRIP.AUTO: NEGATIVE /UL
SODIUM SERPL-SCNC: 141 MMOL/L (ref 136–145)
SP GR UR STRIP.AUTO: 1.01
URATE SERPL-MCNC: 9.3 MG/DL (ref 4–7.5)
UROBILINOGEN UR STRIP.AUTO-MCNC: NORMAL MG/DL
WBC # BLD AUTO: 5.6 X10*3/UL (ref 4.4–11.3)

## 2024-09-20 PROCEDURE — 84156 ASSAY OF PROTEIN URINE: CPT

## 2024-09-20 PROCEDURE — 76770 US EXAM ABDO BACK WALL COMP: CPT | Performed by: RADIOLOGY

## 2024-09-20 PROCEDURE — 86335 IMMUNFIX E-PHORSIS/URINE/CSF: CPT

## 2024-09-20 PROCEDURE — 86160 COMPLEMENT ANTIGEN: CPT

## 2024-09-20 PROCEDURE — 84550 ASSAY OF BLOOD/URIC ACID: CPT

## 2024-09-20 PROCEDURE — 85027 COMPLETE CBC AUTOMATED: CPT

## 2024-09-20 PROCEDURE — 86038 ANTINUCLEAR ANTIBODIES: CPT

## 2024-09-20 PROCEDURE — 84165 PROTEIN E-PHORESIS SERUM: CPT

## 2024-09-20 PROCEDURE — 86036 ANCA SCREEN EACH ANTIBODY: CPT

## 2024-09-20 PROCEDURE — 86334 IMMUNOFIX E-PHORESIS SERUM: CPT

## 2024-09-20 PROCEDURE — 84155 ASSAY OF PROTEIN SERUM: CPT

## 2024-09-20 PROCEDURE — 83970 ASSAY OF PARATHORMONE: CPT

## 2024-09-20 PROCEDURE — 76770 US EXAM ABDO BACK WALL COMP: CPT

## 2024-09-20 PROCEDURE — 80069 RENAL FUNCTION PANEL: CPT

## 2024-09-20 PROCEDURE — 82306 VITAMIN D 25 HYDROXY: CPT

## 2024-09-20 PROCEDURE — 84166 PROTEIN E-PHORESIS/URINE/CSF: CPT

## 2024-09-20 PROCEDURE — 36415 COLL VENOUS BLD VENIPUNCTURE: CPT

## 2024-09-20 PROCEDURE — 80074 ACUTE HEPATITIS PANEL: CPT

## 2024-09-20 PROCEDURE — 81003 URINALYSIS AUTO W/O SCOPE: CPT

## 2024-09-23 LAB
ANA SER QL HEP2 SUBST: NEGATIVE
ANCA AB PATTERN SER IF-IMP: NORMAL
ANCA IGG TITR SER IF: NORMAL {TITER}

## 2024-09-24 LAB
ALBUMIN MFR UR ELPH: 33.9 %
ALPHA1 GLOB MFR UR ELPH: 13 %
ALPHA2 GLOB MFR UR ELPH: 17.7 %
B-GLOBULIN MFR UR ELPH: 20.8 %
GAMMA GLOB MFR UR ELPH: 14.6 %
IMMUNOFIXATION COMMENT: NORMAL
PATH REVIEW - URINE IMMUNOFIXATION: NORMAL
PATH REVIEW-URINE PROTEIN ELECTROPHORESIS: NORMAL
URINE ELECTROPHORESIS COMMENT: NORMAL

## 2024-09-26 ENCOUNTER — LAB (OUTPATIENT)
Dept: LAB | Facility: LAB | Age: 69
End: 2024-09-26
Payer: MEDICARE

## 2024-09-26 ENCOUNTER — APPOINTMENT (OUTPATIENT)
Dept: CARDIOLOGY | Facility: CLINIC | Age: 69
End: 2024-09-26
Payer: MEDICARE

## 2024-09-26 DIAGNOSIS — R33.8 OTHER RETENTION OF URINE: Primary | ICD-10-CM

## 2024-09-26 LAB
ALBUMIN: 4.1 G/DL (ref 3.4–5)
ALPHA 1 GLOBULIN: 0.2 G/DL (ref 0.2–0.6)
ALPHA 2 GLOBULIN: 0.6 G/DL (ref 0.4–1.1)
ANION GAP SERPL CALC-SCNC: 15 MMOL/L (ref 10–20)
BETA GLOBULIN: 0.8 G/DL (ref 0.5–1.2)
BUN SERPL-MCNC: 37 MG/DL (ref 6–23)
CALCIUM SERPL-MCNC: 9.1 MG/DL (ref 8.6–10.3)
CHLORIDE SERPL-SCNC: 103 MMOL/L (ref 98–107)
CO2 SERPL-SCNC: 26 MMOL/L (ref 21–32)
CREAT SERPL-MCNC: 2.67 MG/DL (ref 0.5–1.3)
EGFRCR SERPLBLD CKD-EPI 2021: 25 ML/MIN/1.73M*2
GAMMA GLOBULIN: 1 G/DL (ref 0.5–1.4)
GLUCOSE SERPL-MCNC: 175 MG/DL (ref 74–99)
IMMUNOFIXATION COMMENT: NORMAL
PATH REVIEW - SERUM IMMUNOFIXATION: NORMAL
PATH REVIEW-SERUM PROTEIN ELECTROPHORESIS: NORMAL
POTASSIUM SERPL-SCNC: 3.8 MMOL/L (ref 3.5–5.3)
PROTEIN ELECTROPHORESIS COMMENT: NORMAL
SODIUM SERPL-SCNC: 140 MMOL/L (ref 136–145)

## 2024-09-26 PROCEDURE — 80048 BASIC METABOLIC PNL TOTAL CA: CPT

## 2024-09-26 PROCEDURE — 36415 COLL VENOUS BLD VENIPUNCTURE: CPT

## 2024-10-07 ENCOUNTER — HOSPITAL ENCOUNTER (OUTPATIENT)
Dept: RADIOLOGY | Facility: HOSPITAL | Age: 69
Discharge: HOME | End: 2024-10-07
Payer: MEDICARE

## 2024-10-07 DIAGNOSIS — N13.30 UNSPECIFIED HYDRONEPHROSIS: ICD-10-CM

## 2024-10-07 PROCEDURE — 74176 CT ABD & PELVIS W/O CONTRAST: CPT | Performed by: RADIOLOGY

## 2024-10-07 PROCEDURE — 74176 CT ABD & PELVIS W/O CONTRAST: CPT

## 2024-10-09 ENCOUNTER — HOSPITAL ENCOUNTER (EMERGENCY)
Facility: HOSPITAL | Age: 69
Discharge: HOME | End: 2024-10-09
Attending: STUDENT IN AN ORGANIZED HEALTH CARE EDUCATION/TRAINING PROGRAM
Payer: MEDICARE

## 2024-10-09 VITALS
RESPIRATION RATE: 18 BRPM | OXYGEN SATURATION: 99 % | TEMPERATURE: 97.9 F | SYSTOLIC BLOOD PRESSURE: 171 MMHG | WEIGHT: 157 LBS | DIASTOLIC BLOOD PRESSURE: 89 MMHG | HEART RATE: 89 BPM | HEIGHT: 69 IN | BODY MASS INDEX: 23.25 KG/M2

## 2024-10-09 DIAGNOSIS — R33.9 URINARY RETENTION: Primary | ICD-10-CM

## 2024-10-09 LAB
ANION GAP SERPL CALCULATED.3IONS-SCNC: 12 MMOL/L (ref 10–20)
BUN SERPL-MCNC: 41 MG/DL (ref 6–23)
CALCIUM SERPL-MCNC: 9 MG/DL (ref 8.6–10.3)
CHLORIDE SERPL-SCNC: 106 MMOL/L (ref 98–107)
CO2 SERPL-SCNC: 26 MMOL/L (ref 21–32)
CREAT SERPL-MCNC: 2.17 MG/DL (ref 0.5–1.3)
EGFRCR SERPLBLD CKD-EPI 2021: 32 ML/MIN/1.73M*2
GLUCOSE SERPL-MCNC: 139 MG/DL (ref 74–99)
POTASSIUM SERPL-SCNC: 3.7 MMOL/L (ref 3.5–5.3)
SODIUM SERPL-SCNC: 140 MMOL/L (ref 136–145)

## 2024-10-09 PROCEDURE — 82374 ASSAY BLOOD CARBON DIOXIDE: CPT | Performed by: STUDENT IN AN ORGANIZED HEALTH CARE EDUCATION/TRAINING PROGRAM

## 2024-10-09 PROCEDURE — 2500000005 HC RX 250 GENERAL PHARMACY W/O HCPCS: Performed by: STUDENT IN AN ORGANIZED HEALTH CARE EDUCATION/TRAINING PROGRAM

## 2024-10-09 PROCEDURE — 99284 EMERGENCY DEPT VISIT MOD MDM: CPT | Mod: 25

## 2024-10-09 PROCEDURE — 51798 US URINE CAPACITY MEASURE: CPT

## 2024-10-09 PROCEDURE — 96374 THER/PROPH/DIAG INJ IV PUSH: CPT | Mod: 59

## 2024-10-09 PROCEDURE — 51702 INSERT TEMP BLADDER CATH: CPT

## 2024-10-09 PROCEDURE — 36415 COLL VENOUS BLD VENIPUNCTURE: CPT | Performed by: STUDENT IN AN ORGANIZED HEALTH CARE EDUCATION/TRAINING PROGRAM

## 2024-10-09 PROCEDURE — 2500000004 HC RX 250 GENERAL PHARMACY W/ HCPCS (ALT 636 FOR OP/ED): Performed by: STUDENT IN AN ORGANIZED HEALTH CARE EDUCATION/TRAINING PROGRAM

## 2024-10-09 RX ORDER — LIDOCAINE HYDROCHLORIDE 20 MG/ML
1 JELLY TOPICAL ONCE
Status: COMPLETED | OUTPATIENT
Start: 2024-10-09 | End: 2024-10-09

## 2024-10-09 RX ORDER — MORPHINE SULFATE 4 MG/ML
4 INJECTION, SOLUTION INTRAMUSCULAR; INTRAVENOUS ONCE
Status: COMPLETED | OUTPATIENT
Start: 2024-10-09 | End: 2024-10-09

## 2024-10-09 ASSESSMENT — PAIN SCALES - GENERAL: PAINLEVEL_OUTOF10: 6

## 2024-10-09 ASSESSMENT — PAIN DESCRIPTION - LOCATION: LOCATION: ABDOMEN

## 2024-10-09 ASSESSMENT — COLUMBIA-SUICIDE SEVERITY RATING SCALE - C-SSRS
2. HAVE YOU ACTUALLY HAD ANY THOUGHTS OF KILLING YOURSELF?: NO
6. HAVE YOU EVER DONE ANYTHING, STARTED TO DO ANYTHING, OR PREPARED TO DO ANYTHING TO END YOUR LIFE?: NO
1. IN THE PAST MONTH, HAVE YOU WISHED YOU WERE DEAD OR WISHED YOU COULD GO TO SLEEP AND NOT WAKE UP?: NO

## 2024-10-09 ASSESSMENT — PAIN - FUNCTIONAL ASSESSMENT: PAIN_FUNCTIONAL_ASSESSMENT: 0-10

## 2024-10-09 ASSESSMENT — PAIN DESCRIPTION - ORIENTATION: ORIENTATION: LOWER

## 2024-10-09 ASSESSMENT — PAIN DESCRIPTION - PAIN TYPE: TYPE: ACUTE PAIN

## 2024-10-09 NOTE — ED NOTES
Per Urologist order, ! Attempt with 16fr coude golden attempted with uro-jet usage. Unsuccessful.     Juan Antonio Linda RN  10/09/24 [id number]

## 2024-10-09 NOTE — DISCHARGE INSTRUCTIONS
You were seen in the emergency department today for urinary retention.  You do not need to stay in the hospital.  I do recommend that you follow-up with your urologist in order to have a recheck of your catheter.  If you have any other concern you can return to the Emergency Department for reevaluation.

## 2024-10-09 NOTE — ED TRIAGE NOTES
Cox taken out by urologist yesterday. Has not urinated since. Pt has FULL feeling. A/Ox4 w/o CP SOB  or N/V.

## 2024-10-11 ENCOUNTER — TELEPHONE (OUTPATIENT)
Dept: PRIMARY CARE | Facility: CLINIC | Age: 69
End: 2024-10-11

## 2024-10-11 DIAGNOSIS — N13.9 OBSTRUCTIVE UROPATHY: Primary | ICD-10-CM

## 2024-10-14 DIAGNOSIS — Z00.00 HEALTH CARE MAINTENANCE: ICD-10-CM

## 2024-10-15 RX ORDER — LEVOTHYROXINE SODIUM 100 UG/1
100 TABLET ORAL DAILY
Qty: 90 TABLET | Refills: 0 | Status: SHIPPED | OUTPATIENT
Start: 2024-10-15

## 2024-10-21 ENCOUNTER — TELEPHONE (OUTPATIENT)
Dept: CARDIOLOGY | Facility: CLINIC | Age: 69
End: 2024-10-21
Payer: MEDICARE

## 2024-10-31 ENCOUNTER — LAB (OUTPATIENT)
Dept: LAB | Facility: LAB | Age: 69
End: 2024-10-31
Payer: MEDICARE

## 2024-10-31 ENCOUNTER — OFFICE VISIT (OUTPATIENT)
Dept: UROLOGY | Facility: HOSPITAL | Age: 69
End: 2024-10-31
Payer: MEDICARE

## 2024-10-31 DIAGNOSIS — N13.9 OBSTRUCTIVE UROPATHY: ICD-10-CM

## 2024-10-31 DIAGNOSIS — N13.9 OBSTRUCTIVE UROPATHY: Primary | ICD-10-CM

## 2024-10-31 LAB
ANION GAP SERPL CALC-SCNC: 9 MMOL/L (ref 10–20)
BUN SERPL-MCNC: 30 MG/DL (ref 6–23)
CALCIUM SERPL-MCNC: 9 MG/DL (ref 8.6–10.3)
CHLORIDE SERPL-SCNC: 105 MMOL/L (ref 98–107)
CO2 SERPL-SCNC: 31 MMOL/L (ref 21–32)
CREAT SERPL-MCNC: 1.95 MG/DL (ref 0.5–1.3)
EGFRCR SERPLBLD CKD-EPI 2021: 36 ML/MIN/1.73M*2
GLUCOSE SERPL-MCNC: 107 MG/DL (ref 74–99)
POTASSIUM SERPL-SCNC: 4.3 MMOL/L (ref 3.5–5.3)
SODIUM SERPL-SCNC: 141 MMOL/L (ref 136–145)

## 2024-10-31 PROCEDURE — 36415 COLL VENOUS BLD VENIPUNCTURE: CPT

## 2024-10-31 PROCEDURE — 80048 BASIC METABOLIC PNL TOTAL CA: CPT

## 2024-10-31 PROCEDURE — 99214 OFFICE O/P EST MOD 30 MIN: CPT | Performed by: STUDENT IN AN ORGANIZED HEALTH CARE EDUCATION/TRAINING PROGRAM

## 2024-10-31 PROCEDURE — 99204 OFFICE O/P NEW MOD 45 MIN: CPT | Performed by: STUDENT IN AN ORGANIZED HEALTH CARE EDUCATION/TRAINING PROGRAM

## 2024-10-31 PROCEDURE — G2211 COMPLEX E/M VISIT ADD ON: HCPCS | Performed by: STUDENT IN AN ORGANIZED HEALTH CARE EDUCATION/TRAINING PROGRAM

## 2024-11-03 NOTE — ED PROVIDER NOTES
History of Present Illness     History provided by: Patient and Significant Other  Limitations to History: None  External Records Reviewed with Brief Summary: None    HPI:  Trent Sandhu is a 69 y.o. male presents with urinary retention.  Patient was seen by his urologist yesterday and had his golden removed.  Patient states he has been unable to urinate since.  Wife at bedside, concerned that patient's kidneys may be damaged as he has history of renal injury secondary to retention.  Admits to feeling full, with abdominal pain.     Physical Exam   Triage vitals:  T 36.6 °C (97.9 °F)  HR 98  /82  RR 18  O2 98 %      Physical Exam  Vitals and nursing note reviewed.   Constitutional:       General: He is not in acute distress.     Appearance: He is not ill-appearing.   HENT:      Head: Normocephalic and atraumatic.      Mouth/Throat:      Mouth: Mucous membranes are moist.      Pharynx: Oropharynx is clear.   Eyes:      Extraocular Movements: Extraocular movements intact.      Conjunctiva/sclera: Conjunctivae normal.      Pupils: Pupils are equal, round, and reactive to light.   Cardiovascular:      Rate and Rhythm: Normal rate and regular rhythm.   Pulmonary:      Effort: Pulmonary effort is normal. No respiratory distress.      Breath sounds: Normal breath sounds.   Abdominal:      General: There is no distension.      Palpations: Abdomen is soft.      Tenderness: There is abdominal tenderness in the suprapubic area.   Musculoskeletal:         General: No swelling or deformity. Normal range of motion.      Cervical back: Normal range of motion and neck supple.   Skin:     General: Skin is warm and dry.      Capillary Refill: Capillary refill takes less than 2 seconds.   Neurological:      General: No focal deficit present.      Mental Status: He is alert and oriented to person, place, and time. Mental status is at baseline.   Psychiatric:         Mood and Affect: Mood normal.         Behavior: Behavior  normal.          Medical Decision Making & ED Course   Medical Decision Makin y.o. male presents with urinary retention.  Considered obstruction, acute retention, dehydration.  Patient with prior history of retention, recently had a golden removed.  Bladder scan reveals 1400cc of urine.  Patient previously difficult golden placement.  Spoke with patient's urologist who is on call.  Recommends attempt with 16Fr Coude.  Unsuccessful attempt.  Call back, recommending re-attempt with different sized golden.  Patient golden replaced successfully with adequate drainage of urine.  Wife requesting renal function check given retention.  Appears to be at new patient baseline.  At this time patient is stable for discharge with recommended outpatient follow up with urology.   ----    Social Determinants of Health which Significantly Impact Care: None identified     EKG Independent Interpretation: EKG not obtained    Independent Result Review and Interpretation: Relevant laboratory and radiographic results were reviewed and independently interpreted by myself.  As necessary, they are commented on in the ED Course.    Chronic conditions affecting the patient's care: As documented above in [facility]    The patient was discussed with the following consultants/services:  patient's urologist Dr. Arguello    Care Considerations: As documented above in [facility]    ED Course:  Diagnoses as of 24 0946   Urinary retention       Procedures   Procedures    Clari Velazquez MD  Emergency Medicine     Clari Velazquez MD  [id number]

## 2024-11-05 ENCOUNTER — PROCEDURE VISIT (OUTPATIENT)
Dept: UROLOGY | Facility: HOSPITAL | Age: 69
End: 2024-11-05
Payer: MEDICARE

## 2024-11-05 DIAGNOSIS — N13.9 OBSTRUCTIVE UROPATHY: Primary | ICD-10-CM

## 2024-11-05 PROCEDURE — 99214 OFFICE O/P EST MOD 30 MIN: CPT | Performed by: STUDENT IN AN ORGANIZED HEALTH CARE EDUCATION/TRAINING PROGRAM

## 2024-11-05 PROCEDURE — 99417 PROLNG OP E/M EACH 15 MIN: CPT | Performed by: STUDENT IN AN ORGANIZED HEALTH CARE EDUCATION/TRAINING PROGRAM

## 2024-11-05 PROCEDURE — 52000 CYSTOURETHROSCOPY: CPT | Performed by: STUDENT IN AN ORGANIZED HEALTH CARE EDUCATION/TRAINING PROGRAM

## 2024-11-05 NOTE — PROGRESS NOTES
Patient ID: Trent Sandhu is a 69 y.o. male.    Cystoscopy    Date/Time: 11/5/2024 1:30 PM    Performed by: Bhanu Titus MD MPH  Authorized by: Bhanu Titus MD MPH    Procedure - Bladder Cystoscopy:     Procedure details: cystoscopy    PROCEDURE NOTE:    PREOPERATIVE DIAGNOSIS:  Retention of urine, Neurogenic bladder    POSTOPERATIVE DIAGNOSIS:  Same    OPERATION:  Flexible Cystourethroscopy      SURGEON:  Bhanu Titus MD MPH    ANESTHESIA:  2%  lidocaine jelly    COMPLICATIONS:  None    EBL: Minimal      DISPOSITION:  The patient was discharged home after the procedure, per routine.    INDICATIONS: :  Mr. Sandhu is a 69 y.o. patient with a history of Retention of urine, Neurogenic bladder who presents today for Cystoscopy.     The indications, risks and benefits of this procedure were discussed with the patient, consent was obtained prior to the procedure, and to the best of my judgement the patient seemed to understand and agree to the procedure.    PROCEDURE:  The patient  was brought into the procedure suite and informed consent was reviewed and confirmed. Vital signs were obtained prior to the procedure: There were no vitals taken for this visit..  The patient was escorted onto the stretcher, placed supine, prepped with betadine and draped in the usual standard surgical fashion.  Intraurethral 2% viscous lidocaine jelly was used for local analgesia.  A 16 [place]ian flexible cystourethroscope was inserted into the urethra.   The penile urethra was normal.  The prostate urethra was enlarged.  Upon entering the bladder the entire bladder was surveyed in a 360 degree fashion.  The left and right ureteral orifices were in normal orthotopic position effluxing clear yellow urine, bilaterally.   There was no evidence of any bladder lesions, foreign objects, stones or evidence of any mucosal changes. The cystoscope was then retroflexed.  The bladder neck was then further examined without any evidence of  lesions. The scope was then removed and in an antegrade fashion, the urethra and bladder were again resurveyed with no evidence of additional lesions.  The cystoscope was then fully removed.   The patient tolerated the procedure well.  Vitals were stable after the procedure.  The patient was able to void and was discharged home.  Verbal and written Post procedure instructions were reviewed with the patient.    IMPRESSION:  BPH  Possible bladder tumor right post wall     PLAN:  TURP and TURBT

## 2024-11-05 NOTE — PROGRESS NOTES
Subjective   Patient ID: Trent Sandhu is a 69 y.o. male    HPI  69 y.o. male who presents for cystoscopic evaluation for bladder outlet obstruction, urinary retention with subsequent dieudonne hydro and elevated creatinine     The most recent CT abdomen pelvis wo IV contrast, conducted on 10/7/2024, revealed:  1. Marked circumferential bladder wall thickening measuring 2.1 cm  with stranding of the perivesical fat. Correlate with patient's  history and further workup recommended.      2. Prostate hypertrophy demonstrated.      3. Mild right-sided hydronephrosis significantly improved from the  ultrasound dated 09/20/2024. No definite ureterolithiasis with  characterization distally limited by multiple calcified phleboliths  in the lower pelvis.           Review of Systems    All systems were reviewed. Anything negative was noted in the HPI.    Objective   Physical Exam    General: Well developed, well nourished, alert and cooperative, appears in no acute distress   Eyes: Non-injected conjunctiva, sclera clear, no proptosis   Cardiac: Extremities are warm and well perfused. No edema, cyanosis or pallor   Lungs: Breathing is easy, non-labored. Speaking in clear and complete sentences. Normal diaphragmatic movement   MSK: Ambulatory with steady gait, unassisted   Neuro: Alert and oriented to person, place, and time   Psych: Demonstrates good judgment and reason, without hallucinations, abnormal affect or abnormal behaviors   Skin: No obvious lesions, no rashes       No CVA tenderness bilaterally   No suprapubic pain or discomfort       Past Medical History:   Diagnosis Date    ASHD (arteriosclerotic heart disease)     Hyperlipidemia     Hypertension          No past surgical history on file.    Procedure:  The patient was prepped using a Betadine solution. Lidocaine jelly was instilled into the urethra. The flexible cystoscope was sterilely inserted into the urethra and formal cystoscopy performed in a systematic fashion.  For detailed findings of the procedure, please see Dr. Titus’s remarks below  Scope A used, Cipro 500 mg p.o. given    Assessment/Plan   Cystoscopic evaluation for CURTIS, retention of urine, CKD, possible bladder lesin     69 y.o. male who presents for the above condition, Today, we had a very long and extensive discussion with the patient regarding the pathophysiology, differential diagnosis, risk factor, associated condition, diagnostic work-up and management of BPH and lower urinary tract symptoms and acute urinary retention. I discussed with the patient the need to check his PSA to assess his prostate cancer risk. We discussed at length the mechanism of action, risk, benefit, adverse events and side effect of TURP and TURBT. Also discussed retrograde ejaculation of the side effects of the medication. We had another discussion with the patient regarding lifestyle modifications including low fluid intake after 5 PM, timed voiding every 2 hours, and decrease caffeine intake. I discussed with the patient that in case he had an elevated PSA, we will proceed do an MRI of the prostate.       Plan:  - TURP scheduling  - Possible TURBT      E&M visit today is associated with current or anticipated ongoing medical care services related to a patient's single, serious condition or a complex condition.     11/5/2024    Scribe Attestation  By signing my name below, I, Anthony Ramirez   attest that this documentation has been prepared under the direction and in the presence of Dr. Bhanu Titus

## 2024-11-06 ENCOUNTER — APPOINTMENT (OUTPATIENT)
Dept: UROLOGY | Facility: HOSPITAL | Age: 69
End: 2024-11-06
Payer: MEDICARE

## 2024-11-06 PROBLEM — Z01.810 PREOP CARDIOVASCULAR EXAM: Status: ACTIVE | Noted: 2024-11-06

## 2024-11-06 NOTE — ASSESSMENT & PLAN NOTE
Last LDL cholesterol 43, very good.  Atorvastatin has been effective in reducing LDL cholesterol.  Will recheck fasting lipid panel on return visit.

## 2024-11-07 ENCOUNTER — PREP FOR PROCEDURE (OUTPATIENT)
Dept: UROLOGY | Facility: HOSPITAL | Age: 69
End: 2024-11-07

## 2024-11-07 ENCOUNTER — OFFICE VISIT (OUTPATIENT)
Dept: CARDIOLOGY | Facility: CLINIC | Age: 69
End: 2024-11-07
Payer: MEDICARE

## 2024-11-07 VITALS
BODY MASS INDEX: 22.59 KG/M2 | DIASTOLIC BLOOD PRESSURE: 64 MMHG | SYSTOLIC BLOOD PRESSURE: 124 MMHG | HEART RATE: 70 BPM | WEIGHT: 153 LBS

## 2024-11-07 DIAGNOSIS — Z01.818 PRE-OPERATIVE CLEARANCE: ICD-10-CM

## 2024-11-07 DIAGNOSIS — I10 PRIMARY HYPERTENSION: ICD-10-CM

## 2024-11-07 DIAGNOSIS — E78.2 MIXED HYPERLIPIDEMIA: ICD-10-CM

## 2024-11-07 DIAGNOSIS — I25.10 ASHD (ARTERIOSCLEROTIC HEART DISEASE): ICD-10-CM

## 2024-11-07 DIAGNOSIS — Z01.810 PREOP CARDIOVASCULAR EXAM: Primary | ICD-10-CM

## 2024-11-07 DIAGNOSIS — R33.9 RETENTION OF URINE: Primary | ICD-10-CM

## 2024-11-07 PROCEDURE — 1126F AMNT PAIN NOTED NONE PRSNT: CPT | Performed by: INTERNAL MEDICINE

## 2024-11-07 PROCEDURE — 99214 OFFICE O/P EST MOD 30 MIN: CPT | Performed by: INTERNAL MEDICINE

## 2024-11-07 PROCEDURE — 3074F SYST BP LT 130 MM HG: CPT | Performed by: INTERNAL MEDICINE

## 2024-11-07 PROCEDURE — 1159F MED LIST DOCD IN RCRD: CPT | Performed by: INTERNAL MEDICINE

## 2024-11-07 PROCEDURE — 93010 ELECTROCARDIOGRAM REPORT: CPT | Performed by: INTERNAL MEDICINE

## 2024-11-07 PROCEDURE — 1036F TOBACCO NON-USER: CPT | Performed by: INTERNAL MEDICINE

## 2024-11-07 PROCEDURE — 93005 ELECTROCARDIOGRAM TRACING: CPT | Performed by: INTERNAL MEDICINE

## 2024-11-07 PROCEDURE — 3078F DIAST BP <80 MM HG: CPT | Performed by: INTERNAL MEDICINE

## 2024-11-07 RX ORDER — ALLOPURINOL 100 MG/1
1 TABLET ORAL
COMMUNITY
Start: 2024-10-11

## 2024-11-07 RX ORDER — DOXAZOSIN 1 MG/1
1 TABLET ORAL
COMMUNITY
Start: 2024-10-10

## 2024-11-07 RX ORDER — CIPROFLOXACIN 2 MG/ML
400 INJECTION, SOLUTION INTRAVENOUS ONCE
OUTPATIENT
Start: 2024-11-07 | End: 2024-11-07

## 2024-11-07 RX ORDER — TAMSULOSIN HYDROCHLORIDE 0.4 MG/1
1 CAPSULE ORAL
COMMUNITY
Start: 2024-10-20

## 2024-11-07 RX ORDER — CALCITRIOL 0.25 UG/1
CAPSULE ORAL
COMMUNITY
Start: 2024-10-11

## 2024-11-07 ASSESSMENT — ENCOUNTER SYMPTOMS
NUMBNESS: 0
DYSURIA: 1
HEMATURIA: 0
BLURRED VISION: 0
OCCASIONAL FEELINGS OF UNSTEADINESS: 0
PARESTHESIAS: 0
PALPITATIONS: 0
DYSPNEA ON EXERTION: 0
ABDOMINAL PAIN: 0
LOSS OF SENSATION IN FEET: 0
SHORTNESS OF BREATH: 0
DEPRESSION: 0
COUGH: 0

## 2024-11-07 ASSESSMENT — PATIENT HEALTH QUESTIONNAIRE - PHQ9
2. FEELING DOWN, DEPRESSED OR HOPELESS: NOT AT ALL
SUM OF ALL RESPONSES TO PHQ9 QUESTIONS 1 AND 2: 0
1. LITTLE INTEREST OR PLEASURE IN DOING THINGS: NOT AT ALL

## 2024-11-07 ASSESSMENT — PAIN SCALES - GENERAL: PAINLEVEL_OUTOF10: 0-NO PAIN

## 2024-11-07 NOTE — PROGRESS NOTES
Subjective   Trent Sandhu is a 69 y.o. male.    Chief Complaint:  Pre-op Clearance (S/C turp)    HPI  69-year-old male with a history of coronary artery disease reports for a preoperative cardiac risk assessment.  Patient has known stable coronary artery disease.  He had a drug-eluting stent placed into the circumflex artery back in May 2011.  Since that time he has done well with no recurrent anginal type symptoms.  Left ventricular systolic function was normal on echocardiogram in September.  Again he describes no chest pain.  He is physically active with a good functional capacity.  He developed kidney dysfunction and was found to have significant prostate hypertrophy.  Cox catheter was inserted and large amounts of urine was drained.  He is now being scheduled for check transurethral prostate resection.     Review of Systems   Constitutional: Negative for malaise/fatigue.   HENT:  Negative for congestion.    Eyes:  Negative for blurred vision.   Cardiovascular:  Negative for chest pain, dyspnea on exertion and palpitations.   Respiratory:  Negative for cough and shortness of breath.    Musculoskeletal:  Negative for joint pain.   Gastrointestinal:  Negative for abdominal pain.   Genitourinary:  Positive for dysuria. Negative for hematuria.   Neurological:  Negative for numbness and paresthesias.       Objective   Constitutional:       Appearance: Not in distress.   Eyes:      Conjunctiva/sclera: Conjunctivae normal.   HENT:    Mouth/Throat:      Pharynx: Oropharynx is clear.   Neck:      Vascular: No carotid bruit. JVD normal.   Pulmonary:      Breath sounds: Normal breath sounds. No wheezing. No rales.   Cardiovascular:      Regular rhythm.      Murmurs: There is no murmur.      No gallop.  No click. No rub.   Abdominal:      Palpations: Abdomen is soft.      Tenderness: There is no abdominal tenderness.   Musculoskeletal:         General: No deformity. Neurological:      General: No focal deficit present.          Lab Review:   Lab on 10/31/2024   Component Date Value    Glucose 10/31/2024 107 (H)     Sodium 10/31/2024 141     Potassium 10/31/2024 4.3     Chloride 10/31/2024 105     Bicarbonate 10/31/2024 31     Anion Gap 10/31/2024 9 (L)     Urea Nitrogen 10/31/2024 30 (H)     Creatinine 10/31/2024 1.95 (H)     eGFR 10/31/2024 36 (L)     Calcium 10/31/2024 9.0    Admission on 10/09/2024, Discharged on 10/09/2024   Component Date Value    Glucose 10/09/2024 139 (H)     Sodium 10/09/2024 140     Potassium 10/09/2024 3.7     Chloride 10/09/2024 106     Bicarbonate 10/09/2024 26     Anion Gap 10/09/2024 12     Urea Nitrogen 10/09/2024 41 (H)     Creatinine 10/09/2024 2.17 (H)     eGFR 10/09/2024 32 (L)     Calcium 10/09/2024 9.0    Lab on 09/26/2024   Component Date Value    Glucose 09/26/2024 175 (H)     Sodium 09/26/2024 140     Potassium 09/26/2024 3.8     Chloride 09/26/2024 103     Bicarbonate 09/26/2024 26     Anion Gap 09/26/2024 15     Urea Nitrogen 09/26/2024 37 (H)     Creatinine 09/26/2024 2.67 (H)     eGFR 09/26/2024 25 (L)     Calcium 09/26/2024 9.1    Lab on 09/20/2024   Component Date Value    C4 Complement 09/20/2024 57 (H)     ANCA IFA Titer 09/20/2024 <1:20     ANCA IFA Pattern 09/20/2024 None Detected     ANNELIESE 09/20/2024 Negative     Hepatitis B Surface AG 09/20/2024 Nonreactive     Hepatitis A  AB- IgM 09/20/2024 Nonreactive     Hepatitis B Core AB; IgM 09/20/2024 Nonreactive     Hepatitis C AB 09/20/2024 Nonreactive     Vitamin D, 25-Hydroxy, T* 09/20/2024 48     C3 Complement 09/20/2024 160     Uric Acid 09/20/2024 9.3 (H)     Glucose 09/20/2024 133 (H)     Sodium 09/20/2024 141     Potassium 09/20/2024 4.1     Chloride 09/20/2024 105     Bicarbonate 09/20/2024 26     Anion Gap 09/20/2024 14     Urea Nitrogen 09/20/2024 39 (H)     Creatinine 09/20/2024 2.93 (H)     eGFR 09/20/2024 22 (L)     Calcium 09/20/2024 8.9     Phosphorus 09/20/2024 4.3     Albumin 09/20/2024 4.2     Parathyroid Hormone, Int*  09/20/2024 219.7 (H)     WBC 09/20/2024 5.6     nRBC 09/20/2024 0.0     RBC 09/20/2024 3.71 (L)     Hemoglobin 09/20/2024 11.8 (L)     Hematocrit 09/20/2024 36.2 (L)     MCV 09/20/2024 98     MCH 09/20/2024 31.8     MCHC 09/20/2024 32.6     RDW 09/20/2024 13.6     Platelets 09/20/2024 147 (L)     Total Protein 09/20/2024 6.8     Albumin 09/20/2024 4.1     Alpha 1 Globulin 09/20/2024 0.2     Alpha 2 Globulin 09/20/2024 0.6     Beta Globulin 09/20/2024 0.8     Gamma 09/20/2024 1.0     Protein Electrophoresis * 09/20/2024 Normal.     Immunofixation Comment 09/20/2024 No monoclonal protein detected by immunofixation.     Path Review - Serum Prot* 09/20/2024 Reviewed and approved by SYLVIA FRIEDMAN on 9/26/24 at 9:36 PM.         Path Review - Serum Immu* 09/20/2024 Reviewed and approved by SYLVIA FRIEDMAN on 9/26/24 at 9:36 PM.         Color, Urine 09/20/2024 Colorless (N)     Appearance, Urine 09/20/2024 Clear     Specific Gravity, Urine 09/20/2024 1.007     pH, Urine 09/20/2024 5.0     Protein, Urine 09/20/2024 NEGATIVE     Glucose, Urine 09/20/2024 Normal     Blood, Urine 09/20/2024 NEGATIVE     Ketones, Urine 09/20/2024 NEGATIVE     Bilirubin, Urine 09/20/2024 NEGATIVE     Urobilinogen, Urine 09/20/2024 Normal     Nitrite, Urine 09/20/2024 NEGATIVE     Leukocyte Esterase, Urine 09/20/2024 NEGATIVE     Total Protein, Urine Ran* 09/20/2024 6     Albumin % 09/20/2024 33.9     Alpha 1 Globulin % 09/20/2024 13.0     Alpha 2 Globulin % 09/20/2024 17.7     Beta Globulin % 09/20/2024 20.8     Gamma Globulin % 09/20/2024 14.6     Urine Electrophoresis Co* 09/20/2024 Normal.        Path Review-Urine Protei* 09/20/2024 Reviewed and approved by SYLVIA FRIEDMAN on 9/24/24 at 8:54 PM.         Path Review - Urine Immu* 09/20/2024 Reviewed and approved by SYLVIA FRIEDMAN on 9/24/24 at 8:54 PM.         Immunofixation Comment 09/20/2024 No monoclonal protein detected by immunofixation.    Hospital Outpatient Visit on 09/11/2024    Component Date Value    AV pk ariana 09/11/2024 0.82     LVOT diam 09/11/2024 2.00     AV mn grad 09/11/2024 1.0     MV E/A ratio 09/11/2024 1.18     Tricuspid annular plane * 09/11/2024 2.0     LV Biplane EF 09/11/2024 55     LV EF 09/11/2024 58     RV free wall pk S' 09/11/2024 10.20     RVSP 09/11/2024 27.2     LVIDd 09/11/2024 4.14     AV pk grad 09/11/2024 2.7     Aortic Valve Area by Con* 09/11/2024 2.63     Aortic Valve Area by Con* 09/11/2024 3.05     LV A4C EF 09/11/2024 55.4        Assessment/Plan   The primary encounter diagnosis was Preop cardiovascular exam. Diagnoses of Pre-operative clearance, Mixed hyperlipidemia, Primary hypertension, and ASHD (arteriosclerotic heart disease) were also pertinent to this visit.    Hyperlipemia  Last LDL cholesterol 43, very good.  Atorvastatin has been effective in reducing LDL cholesterol.  Will recheck fasting lipid panel on return visit.    ASHD (arteriosclerotic heart disease)  Stable from a cardiac standpoint.  He exhibits no chest pain or anginal symptoms.  He had no episodes of heart failure.  Will continue standard risk factor modification and will follow on a clinical basis.    HTN (hypertension)  Blood pressure adequately controlled on current regimen no change will be made at this time.    Preop cardiovascular exam  The patient is stable from a cardiac standpoint.  He exhibits no chest pain or anginal symptoms.  He has had no episodes of heart failure.  He has a good functional capacity.  Twelve-lead EKG is normal.  Based on these factors I believe he may proceed with the prostate surgery at acceptable low cardiovascular risk.  He will likely need to stop his aspirin 1 week prior to the procedure and then restart postoperatively when it is felt bleeding this is acceptable.

## 2024-11-07 NOTE — ASSESSMENT & PLAN NOTE
Stable from a cardiac standpoint.  He exhibits no chest pain or anginal symptoms.  He had no episodes of heart failure.  Will continue standard risk factor modification and will follow on a clinical basis.

## 2024-11-07 NOTE — ASSESSMENT & PLAN NOTE
The patient is stable from a cardiac standpoint.  He exhibits no chest pain or anginal symptoms.  He has had no episodes of heart failure.  He has a good functional capacity.  Twelve-lead EKG is normal.  Based on these factors I believe he may proceed with the prostate surgery at acceptable low cardiovascular risk.  He will likely need to stop his aspirin 1 week prior to the procedure and then restart postoperatively when it is felt bleeding this is acceptable.

## 2024-11-08 ENCOUNTER — LAB (OUTPATIENT)
Dept: LAB | Facility: LAB | Age: 69
End: 2024-11-08
Payer: MEDICARE

## 2024-11-08 DIAGNOSIS — N18.32 CHRONIC KIDNEY DISEASE, STAGE 3B (MULTI): Primary | ICD-10-CM

## 2024-11-08 LAB
ALBUMIN SERPL BCP-MCNC: 4 G/DL (ref 3.4–5)
ANION GAP SERPL CALC-SCNC: 12 MMOL/L (ref 10–20)
BUN SERPL-MCNC: 29 MG/DL (ref 6–23)
CALCIUM SERPL-MCNC: 8.9 MG/DL (ref 8.6–10.3)
CHLORIDE SERPL-SCNC: 104 MMOL/L (ref 98–107)
CO2 SERPL-SCNC: 29 MMOL/L (ref 21–32)
CREAT SERPL-MCNC: 1.69 MG/DL (ref 0.5–1.3)
EGFRCR SERPLBLD CKD-EPI 2021: 43 ML/MIN/1.73M*2
GLUCOSE SERPL-MCNC: 117 MG/DL (ref 74–99)
PHOSPHATE SERPL-MCNC: 3.4 MG/DL (ref 2.5–4.9)
POTASSIUM SERPL-SCNC: 4 MMOL/L (ref 3.5–5.3)
SODIUM SERPL-SCNC: 141 MMOL/L (ref 136–145)

## 2024-11-08 PROCEDURE — 80069 RENAL FUNCTION PANEL: CPT

## 2024-11-08 PROCEDURE — 36415 COLL VENOUS BLD VENIPUNCTURE: CPT

## 2024-12-01 NOTE — PROGRESS NOTES
[facility]  Outpatient Clinic Note    Patient Name:  Trent Sandhu  MRN:  [id number]  :  1955  Date of Service: 2024     Visit type: Follow up visit    HPI    Interval History:  Trent Sandhu is a 69 y.o. male who is being seen today for  problems listed below.     Problem list/Chief complaints:  Neurogenic bladder with urinary retention - s/p cystourethroscopy 24 with Dr. Titus      10/31/24: NPV with Dr. Titus. 69 y.o. male who presents for bladder outlet obstruction, urinary retention with subsequent dieudonne hydro and elevated creatinine      The most recent CT abdomen pelvis wo IV contrast, conducted on 10/7/2024, revealed:  1. Marked circumferential bladder wall thickening measuring 2.1 cm  with stranding of the perivesical fat. Correlate with patient's  history and further workup recommended.      2. Prostate hypertrophy demonstrated.      3. Mild right-sided hydronephrosis significantly improved from the  ultrasound dated 2024. No definite ureterolithiasis with  characterization distally limited by multiple calcified phleboliths  in the lower pelvis.    24: S/p cystoscopy with Dr. Titus. IMPRESSION:  BPH. Possible bladder tumor right post wall    PLAN: TURP and TURBT    24: Patient presents for follow up and golden catheter exchange. He has no complaints, denies hematuria, no fever or chills.    Past Medical History:   Diagnosis Date    ASHD (arteriosclerotic heart disease)     Hyperlipidemia     Hypertension        History reviewed. No pertinent surgical history.    Social History     Socioeconomic History    Marital status:      Spouse name: Not on file    Number of children: Not on file    Years of education: Not on file    Highest education level: Not on file   Occupational History    Not on file   Tobacco Use    Smoking status: Never    Smokeless tobacco: Never   Substance and Sexual Activity    Alcohol use: Not Currently    Drug use: Never    Sexual  activity: Defer   Other Topics Concern    Not on file   Social History Narrative    Not on file     Social Drivers of Health     Financial Resource Strain: Not on file   Food Insecurity: Not on file   Transportation Needs: Not on file   Physical Activity: Not on file   Stress: Not on file   Social Connections: Not on file   Intimate Partner Violence: Not on file   Housing Stability: Not on file       Allergies   Allergen Reactions    Sulfa (Sulfonamide Antibiotics) Unknown     severe headaches          Current Outpatient Medications:     allopurinol (Zyloprim) 100 mg tablet, Take 1 tablet (100 mg) by mouth early in the morning.., Disp: , Rfl:     aspirin 81 mg chewable tablet, Chew 1 tablet (81 mg) once daily., Disp: , Rfl:     atorvastatin (Lipitor) 80 mg tablet, TAKE 1 TABLET DAILY, Disp: 90 tablet, Rfl: 3    calcitriol (Rocaltrol) 0.25 mcg capsule, TAKE 1 CAPSULE BY MOUTH THREE TIMES A WEEK FOR 14 DAYS, Disp: , Rfl:     doxazosin (Cardura) 1 mg tablet, Take 1 tablet (1 mg) by mouth early in the morning.., Disp: , Rfl:     metoprolol tartrate (Lopressor) 25 mg tablet, Take 1 tablet (25 mg) by mouth 2 times a day., Disp: 180 tablet, Rfl: 3    nitroglycerin (Nitrostat) 0.4 mg SL tablet, Place 1 tablet (0.4 mg) under the tongue every 5 minutes if needed for chest pain., Disp: , Rfl:     Synthroid 100 mcg tablet, TAKE 1 TABLET ONCE DAILY ASDIRECTED, Disp: 90 tablet, Rfl: 0    tamsulosin (Flomax) 0.4 mg 24 hr capsule, Take 1 capsule (0.4 mg) by mouth early in the morning.., Disp: , Rfl:     vit C,U-Qt-fzibz-lutein-zeaxan (PreserVision AREDS-2) 250-90-40-1 mg tablet,chewable, Chew 1 tablet 2 times a day., Disp: , Rfl:      Review of system:  All other systems have been reviewed and are negative for complaints      Last recorded vitals:  There were no vitals taken for this visit.    Physical Exam:  General: Appears comfortable and in no apparent distress.  Head: Normocephalic, atraumatic  Eyes: Non-injected conjunctiva,  sclera clear, no proptosis  Lungs: Breathing is easy, non-labored. Speaking in clear and complete sentences. Normal diaphragmatic movement.  Cardiovascular: no peripheral edema, cyanosis or pallor.   Abdomen: soft, non-distended, non-tender  : Bladder: non tender, not distended; Urethra and meatus: normal size, position, no lesions or discharge  Penis: normal (circumcised) phallus, no palpable plaque, no lesions/masses/deformity  MSK: Ambulatory with steady gait, unassisted  Skin: No visible rashes or lesions  Neurologic: Alert, oriented to person, place, and time  Psychiatric: mood and affect appropriate      Imaging  === 10/07/24 ===    CT ABDOMEN PELVIS WO IV CONTRAST    - Impression -  1. Marked circumferential bladder wall thickening measuring 2.1 cm  with stranding of the perivesical fat. Correlate with patient's  history and further workup recommended.    2. Prostate hypertrophy demonstrated.    3. Mild right-sided hydronephrosis significantly improved from the  ultrasound dated 09/20/2024. No definite ureterolithiasis with  characterization distally limited by multiple calcified phleboliths  in the lower pelvis.      MACRO:  None    Signed by: Sarah Mix 10/9/2024 10:36 AM  Dictation workstation:   [id number]    PROCEDURE: Cystoscopy with Dr. Titus 11/5/24  The patient  was brought into the procedure suite and informed consent was reviewed and confirmed. Vital signs were obtained prior to the procedure: There were no vitals taken for this visit..  The patient was escorted onto the stretcher, placed supine, prepped with betadine and draped in the usual standard surgical fashion.  Intraurethral 2% viscous lidocaine jelly was used for local analgesia.  A 16 [place] flexible cystourethroscope was inserted into the urethra.   The penile urethra was normal.  The prostate urethra was enlarged.  Upon entering the bladder the entire bladder was surveyed in a 360 degree fashion.  The left and right ureteral  orifices were in normal orthotopic position effluxing clear yellow urine, bilaterally.   There was no evidence of any bladder lesions, foreign objects, stones or evidence of any mucosal changes. The cystoscope was then retroflexed.  The bladder neck was then further examined without any evidence of lesions. The scope was then removed and in an antegrade fashion, the urethra and bladder were again resurveyed with no evidence of additional lesions.  The cystoscope was then fully removed.   The patient tolerated the procedure well.  Vitals were stable after the procedure.  The patient was able to void and was discharged home.  Verbal and written Post procedure instructions were reviewed with the patient.     IMPRESSION:  BPH  Possible bladder tumor right post wall      PLAN:  TURP and TURBT  Labs  Lab on 11/08/2024   Component Date Value    Glucose 11/08/2024 117 (H)     Sodium 11/08/2024 141     Potassium 11/08/2024 4.0     Chloride 11/08/2024 104     Bicarbonate 11/08/2024 29     Anion Gap 11/08/2024 12     Urea Nitrogen 11/08/2024 29 (H)     Creatinine 11/08/2024 1.69 (H)     eGFR 11/08/2024 43 (L)     Calcium 11/08/2024 8.9     Phosphorus 11/08/2024 3.4     Albumin 11/08/2024 4.0        Assessment and Plan:  Trent Sandhu is a 69 y.o. male with history of Neurogenic bladder with urinary retention, scheduled for TURP and possible TURBT with Dr. Titus on 12/20/24, who presents for catheter exchange.     Bladder Catheterization  Current indwelling catheter, 16 fr, was removed without difficulty.  Prep: patient was prepped and draped in usual sterile fashion    Prep type: betadine       Procedure Details    Procedure: catheter change      Catheter insertion: urethral    Catheter size: 16 fr coude    Catheter provided by: health care organization      Number of initial attempts: 1    Urine characteristics: clear, yellow    Balloon inflation amount (mL) comment: 10 ml    Leg bag attached: yes      Post-Procedure Details      Outcome: patient tolerated procedure well with no complications      Post-procedure interventions: post-procedure education provided      Disposition: discharged home in satisfactory condition        Plan:  -Cox catheter exchanged with no issues  -Patient encouraged to drink plenty of fluids for hydration and urine output  -Prostate surgery scheduled with Dr. Titus on 12/20/24  -Follow-up as scheduled, or sooner if needed, to reassess symptoms and for medication refill.    All questions and concerns were addressed. Patient verbalizes understanding and has no other questions at this time.     Some elements copied from Dr. Titus's note on 11/5/24, the elements have been updated and all reflect current decision making from today, 12/02/24    E&M visit today is associated with current or anticipated ongoing medical care services related to a patient's single, serious condition or a complex condition.    LUCY Chavarria-CNP   [facility]  12/02/24 11:31 AM

## 2024-12-02 ENCOUNTER — OFFICE VISIT (OUTPATIENT)
Dept: UROLOGY | Facility: HOSPITAL | Age: 69
End: 2024-12-02
Payer: MEDICARE

## 2024-12-02 ENCOUNTER — CLINICAL SUPPORT (OUTPATIENT)
Dept: PREADMISSION TESTING | Facility: HOSPITAL | Age: 69
End: 2024-12-02
Payer: MEDICARE

## 2024-12-02 DIAGNOSIS — Z97.8 FOLEY CATHETER IN PLACE: ICD-10-CM

## 2024-12-02 DIAGNOSIS — R33.9 RETENTION OF URINE: Primary | ICD-10-CM

## 2024-12-02 DIAGNOSIS — R33.9 RETENTION OF URINE: ICD-10-CM

## 2024-12-02 PROCEDURE — 1160F RVW MEDS BY RX/DR IN RCRD: CPT | Performed by: NURSE PRACTITIONER

## 2024-12-02 PROCEDURE — 99213 OFFICE O/P EST LOW 20 MIN: CPT | Performed by: NURSE PRACTITIONER

## 2024-12-02 PROCEDURE — 1159F MED LIST DOCD IN RCRD: CPT | Performed by: NURSE PRACTITIONER

## 2024-12-02 PROCEDURE — 51702 INSERT TEMP BLADDER CATH: CPT | Performed by: NURSE PRACTITIONER

## 2024-12-02 PROCEDURE — 1036F TOBACCO NON-USER: CPT | Performed by: NURSE PRACTITIONER

## 2024-12-06 ENCOUNTER — LAB (OUTPATIENT)
Dept: LAB | Facility: LAB | Age: 69
End: 2024-12-06
Payer: MEDICARE

## 2024-12-06 ENCOUNTER — PRE-ADMISSION TESTING (OUTPATIENT)
Dept: PREADMISSION TESTING | Facility: HOSPITAL | Age: 69
End: 2024-12-06
Payer: MEDICARE

## 2024-12-06 VITALS
DIASTOLIC BLOOD PRESSURE: 60 MMHG | WEIGHT: 154.32 LBS | SYSTOLIC BLOOD PRESSURE: 124 MMHG | TEMPERATURE: 98.1 F | OXYGEN SATURATION: 100 % | BODY MASS INDEX: 23.39 KG/M2 | RESPIRATION RATE: 18 BRPM | HEART RATE: 65 BPM | HEIGHT: 68 IN

## 2024-12-06 DIAGNOSIS — R39.9 LOWER URINARY TRACT SYMPTOMS (LUTS): ICD-10-CM

## 2024-12-06 DIAGNOSIS — Z01.818 PREOP TESTING: ICD-10-CM

## 2024-12-06 DIAGNOSIS — Z01.818 PREOP TESTING: Primary | ICD-10-CM

## 2024-12-06 LAB
ANION GAP SERPL CALC-SCNC: 9 MMOL/L (ref 10–20)
APPEARANCE UR: CLEAR
BACTERIA #/AREA URNS AUTO: ABNORMAL /HPF
BASOPHILS # BLD AUTO: 0.03 X10*3/UL (ref 0–0.1)
BASOPHILS NFR BLD AUTO: 0.5 %
BILIRUB UR STRIP.AUTO-MCNC: NEGATIVE MG/DL
BUN SERPL-MCNC: 22 MG/DL (ref 6–23)
CALCIUM SERPL-MCNC: 9.3 MG/DL (ref 8.6–10.3)
CHLORIDE SERPL-SCNC: 107 MMOL/L (ref 98–107)
CO2 SERPL-SCNC: 31 MMOL/L (ref 21–32)
COLOR UR: ABNORMAL
CREAT SERPL-MCNC: 1.51 MG/DL (ref 0.5–1.3)
EGFRCR SERPLBLD CKD-EPI 2021: 49 ML/MIN/1.73M*2
EOSINOPHIL # BLD AUTO: 0.27 X10*3/UL (ref 0–0.4)
EOSINOPHIL NFR BLD AUTO: 4.8 %
ERYTHROCYTE [DISTWIDTH] IN BLOOD BY AUTOMATED COUNT: 13.6 % (ref 11.5–14.5)
GLUCOSE SERPL-MCNC: 104 MG/DL (ref 74–99)
GLUCOSE UR STRIP.AUTO-MCNC: NORMAL MG/DL
HCT VFR BLD AUTO: 42.2 % (ref 41–52)
HGB BLD-MCNC: 14.2 G/DL (ref 13.5–17.5)
IMM GRANULOCYTES # BLD AUTO: 0.02 X10*3/UL (ref 0–0.5)
IMM GRANULOCYTES NFR BLD AUTO: 0.4 % (ref 0–0.9)
KETONES UR STRIP.AUTO-MCNC: NEGATIVE MG/DL
LEUKOCYTE ESTERASE UR QL STRIP.AUTO: ABNORMAL
LYMPHOCYTES # BLD AUTO: 0.92 X10*3/UL (ref 0.8–3)
LYMPHOCYTES NFR BLD AUTO: 16.5 %
MCH RBC QN AUTO: 31.8 PG (ref 26–34)
MCHC RBC AUTO-ENTMCNC: 33.6 G/DL (ref 32–36)
MCV RBC AUTO: 95 FL (ref 80–100)
MONOCYTES # BLD AUTO: 0.56 X10*3/UL (ref 0.05–0.8)
MONOCYTES NFR BLD AUTO: 10 %
NEUTROPHILS # BLD AUTO: 3.79 X10*3/UL (ref 1.6–5.5)
NEUTROPHILS NFR BLD AUTO: 67.8 %
NITRITE UR QL STRIP.AUTO: NEGATIVE
NRBC BLD-RTO: 0 /100 WBCS (ref 0–0)
PH UR STRIP.AUTO: 5 [PH]
PLATELET # BLD AUTO: 156 X10*3/UL (ref 150–450)
POTASSIUM SERPL-SCNC: 4.8 MMOL/L (ref 3.5–5.3)
PROT UR STRIP.AUTO-MCNC: NEGATIVE MG/DL
RBC # BLD AUTO: 4.46 X10*6/UL (ref 4.5–5.9)
RBC # UR STRIP.AUTO: ABNORMAL /UL
RBC #/AREA URNS AUTO: ABNORMAL /HPF
SODIUM SERPL-SCNC: 142 MMOL/L (ref 136–145)
SP GR UR STRIP.AUTO: 1.01
UROBILINOGEN UR STRIP.AUTO-MCNC: NORMAL MG/DL
WBC # BLD AUTO: 5.6 X10*3/UL (ref 4.4–11.3)
WBC #/AREA URNS AUTO: ABNORMAL /HPF

## 2024-12-06 PROCEDURE — 87086 URINE CULTURE/COLONY COUNT: CPT

## 2024-12-06 PROCEDURE — 85025 COMPLETE CBC W/AUTO DIFF WBC: CPT

## 2024-12-06 PROCEDURE — 87186 SC STD MICRODIL/AGAR DIL: CPT

## 2024-12-06 PROCEDURE — 99204 OFFICE O/P NEW MOD 45 MIN: CPT | Performed by: PHYSICIAN ASSISTANT

## 2024-12-06 PROCEDURE — 87077 CULTURE AEROBIC IDENTIFY: CPT

## 2024-12-06 PROCEDURE — 36415 COLL VENOUS BLD VENIPUNCTURE: CPT

## 2024-12-06 PROCEDURE — 80048 BASIC METABOLIC PNL TOTAL CA: CPT

## 2024-12-06 PROCEDURE — 81001 URINALYSIS AUTO W/SCOPE: CPT

## 2024-12-06 ASSESSMENT — ENCOUNTER SYMPTOMS
DOUBLE VISION: 0
LIMITED RANGE OF MOTION: 0
SINUS CONGESTION: 0
TROUBLE SWALLOWING: 0
DYSPNEA AT REST: 0
MYALGIAS: 0
PALPITATIONS: 0
EYE PAIN: 0
NUMBNESS: 0
NECK PAIN: 0
VOMITING: 0
FEVER: 0
ABDOMINAL DISTENTION: 0
WHEEZING: 0
SHORTNESS OF BREATH: 0
DIARRHEA: 0
COUGH: 0
WEAKNESS: 0
NAUSEA: 0
WOUND: 0
EXCESSIVE BLEEDING: 0
VISUAL CHANGE: 1
ARTHRALGIAS: 0
DIFFICULTY URINATING: 1
DYSURIA: 0
LIGHT-HEADEDNESS: 0
BRUISES/BLEEDS EASILY: 0
DYSPNEA WITH EXERTION: 0
ABDOMINAL PAIN: 0
RHINORRHEA: 0
EYE DISCHARGE: 0
NECK STIFFNESS: 0
CONSTIPATION: 0
SKIN CHANGES: 0
CHILLS: 0
UNEXPECTED WEIGHT CHANGE: 0
CONFUSION: 0
HEMOPTYSIS: 0
BLOOD IN STOOL: 0

## 2024-12-06 NOTE — H&P (VIEW-ONLY)
"[facility]/PAT Evaluation       Name: Trent Sandhu (Trent Sandhu)  /Age: 1955/69 y.o.         Date of Consult: 24    Referring Provider: Dr. Titus    Surgery, Date, and Length:     Transurethral Prostate Resection  Possible Cystoscopy;Tissue Excision(psb)   24, 90MIN    Trent Sandhu is a 69 year-old male who presents to the [facility] for perioperative risk assessment prior to surgery.    Patient presents with a primary diagnosis of BPH with urinary retention. Pt has indwelling golden catheter since 24 at which point 2L of urine was removed from bladder; most recently changed on 24.  He denies dysuria, f/c/n/v or gross hematuria. Cystoscopy 24 shows possible bladder tumor on right posterior wall.  Pt wishes to proceed with surgery as planned.     This note was created in part upon personal review of patient's medical records.      Patient is scheduled to have Transurethral Prostate Resection  Possible Cystoscopy;Tissue Excision(psb)      Pt denies any past history of anesthetic complications such as PONV, awareness, prolonged sedation, dental damage, aspiration, cardiac arrest, difficult intubation, difficult I.V. access or unexpected hospital admissions.  NO malignant hyperthermia and or pseudocholinesterase deficiency.  No history of blood transfusions     The patient is not a [employer] and will accept blood and blood products if medically indicated.   Type and screen NOT sent.     Past Medical History:   Diagnosis Date    ASHD (arteriosclerotic heart disease)     drug-eluting stent placed into the circumflex artery back in May 2011.    Benign prostatic hyperplasia     Cancer (Multi)     melanoma in eye, left eye    Cardiology follow-up encounter 2024    Nathan Gilmore DO    CKD (chronic kidney disease) stage 3, GFR 30-59 ml/min (Multi)     24 Cr 1.69 GFR 43    Encounter for pre-operative cardiovascular clearance     Dr. Gilmore \"Stable from a cardiac standpoint. No " "CP/anginal symptoms. No episodes of heart failure. Good functional capacity. Normal twelve-lead EKG. He may proceed with the prostate surgery at acceptable low cardiovascular risk.  He will likely need to stop his aspirin 1 week prior to the procedure & then restart postoperatively when it is felt bleeding this is acceptable.\"    H/O echocardiogram 09/11/2024    CONCLUSIONS:   1. Left ventricular ejection fraction is normal, by visual estimate at 55-60%.   2. Spectral Doppler shows an impaired relaxation pattern of left ventricular diastolic filling.   3. There is normal right ventricular global systolic function.   4. Right ventricular within normal limits.    H/O heart artery stent 05/2011    drug-eluting stent placed into the circumflex artery    Heart disease 5/2011    Hyperlipidemia     Hypertension     Myocardial infarction (Multi) 2011    Retention of urine     Plan: Transurethral Prostate Resection; Possible Cystoscopy;Tissue Excision 12/20/24       Past Surgical History:   Procedure Laterality Date    CORONARY ANGIOPLASTY  2011    EYE SURGERY Left 12/17/1997    HERNIA REPAIR  2022       Patient Sexual activity questions deferred to the physician.    Family History   Problem Relation Name Age of Onset    Heart attack Mother Madina Sandhu     Alzheimer's disease Mother Madina Sawchik     Heart disease Mother Madina Edson     Cancer Mother Madina Sandhu     Emphysema Father Narendra Shahidchiky     Lung disease Father Narendra Sawchik     Heart disease Maternal Grandmother Yazmin Bird      Social History     Socioeconomic History    Marital status:      Spouse name: Not on file    Number of children: Not on file    Years of education: Not on file    Highest education level: Not on file   Occupational History    Not on file   Tobacco Use    Smoking status: Never    Smokeless tobacco: Never   Vaping Use    Vaping status: Never Used   Substance and Sexual Activity    Alcohol use: Not Currently    Drug use: Never    Sexual " activity: Defer   Other Topics Concern    Not on file   Social History Narrative    Not on file     Social Drivers of Health     Financial Resource Strain: Not on file   Food Insecurity: Not on file   Transportation Needs: Not on file   Physical Activity: Not on file   Stress: Not on file   Social Connections: Not on file   Intimate Partner Violence: Not on file   Housing Stability: Not on file        Allergies   Allergen Reactions    Sulfa (Sulfonamide Antibiotics) Headache     severe headaches         Current Outpatient Medications:     allopurinol (Zyloprim) 100 mg tablet, Take 2 tablets (200 mg) by mouth once daily., Disp: , Rfl:     aspirin 81 mg chewable tablet, Chew 1 tablet (81 mg) once daily., Disp: , Rfl:     atorvastatin (Lipitor) 80 mg tablet, TAKE 1 TABLET DAILY, Disp: 90 tablet, Rfl: 3    calcitriol (Rocaltrol) 0.25 mcg capsule, Take 1 capsule (0.25 mcg) by mouth 3 (three) times a week. Mon, Wed, Fri, Disp: , Rfl:     doxazosin (Cardura) 1 mg tablet, Take 1 tablet (1 mg) by mouth early in the morning.., Disp: , Rfl:     metoprolol tartrate (Lopressor) 25 mg tablet, Take 1 tablet (25 mg) by mouth 2 times a day., Disp: 180 tablet, Rfl: 3    Synthroid 100 mcg tablet, TAKE 1 TABLET ONCE DAILY ASDIRECTED (Patient taking differently: Take 1 tablet (100 mcg) by mouth early in the morning..), Disp: 90 tablet, Rfl: 0    tamsulosin (Flomax) 0.4 mg 24 hr capsule, Take 1 capsule (0.4 mg) by mouth once daily., Disp: , Rfl:     vit C,U-Pp-ylfib-lutein-zeaxan (PreserVision AREDS-2) 250-90-40-1 mg tablet,chewable, Chew 1 tablet 2 times a day., Disp: , Rfl:     nitroglycerin (Nitrostat) 0.4 mg SL tablet, Place 1 tablet (0.4 mg) under the tongue every 5 minutes if needed for chest pain. (Patient not taking: Reported on 12/6/2024), Disp: , Rfl:          PAT ROS:   Constitutional:    no fever   no chills   no unexpected weight change  Neuro/Psych:    no numbness   no weakness   no light-headedness   no confusion  Eyes:     Prosthetic left eye   no discharge   no pain   vision loss (left eye)   no diplopia   no visual disturbance   use of corrective lenses  Ears:    no ear pain   no hearing loss   no tinnitus  Nose:    no nasal discharge   no sinus congestion   no epistaxis  Mouth:    no dental issues   no mouth pain   no oral bleeding   no mouth lesions  Throat:    no throat pain   no dysphagia  Neck:    no neck pain   no neck stiffness  Cardio:    Functional 4 Mets. Patient denies SOB walking up 2 flights of stairs   Walking 2 miles daily   no chest pain   no palpitations   no peripheral edema   no dyspnea   no JOHNSON  Respiratory:    no cough   no wheezing   no hemoptysis   no shortness of breath  Endocrine:    no cold intolerance   no heat intolerance  GI:    no abdominal distention   no abdominal pain   no constipation   no diarrhea   no nausea   no vomiting   no blood in stool  :    Cox catheter in place   difficulty urinating   no dysuria   no oliguria  Musculoskeletal:    no arthralgias   no myalgias   no decreased ROM  Hematologic:    does not bruise/bleed easily   no excessive bleeding   no history of blood transfusion   no blood clots  Skin:   no skin changes   no sores/wound   no rash      Physical Exam  Constitutional:       General: He is not in acute distress.     Appearance: Normal appearance. He is not ill-appearing, toxic-appearing or diaphoretic.   HENT:      Head: Normocephalic and atraumatic.      Nose: Nose normal. No congestion or rhinorrhea.      Mouth/Throat:      Mouth: Mucous membranes are moist.      Pharynx: No posterior oropharyngeal erythema.   Eyes:      Extraocular Movements: Extraocular movements intact.      Conjunctiva/sclera: Conjunctivae normal.      Comments: Left prosthetic eye   Cardiovascular:      Rate and Rhythm: Normal rate and regular rhythm.      Pulses: Normal pulses.      Heart sounds: Normal heart sounds. No murmur heard.     No friction rub. No gallop.   Pulmonary:      Effort:  "Pulmonary effort is normal. No respiratory distress.      Breath sounds: Normal breath sounds. No stridor. No wheezing, rhonchi or rales.   Abdominal:      General: Bowel sounds are normal. There is no distension.      Palpations: Abdomen is soft. There is no mass.      Tenderness: There is no abdominal tenderness. There is no guarding or rebound.      Hernia: No hernia is present.   Genitourinary:     Comments: Indwelling golden catheter in place and functioning   Musculoskeletal:         General: No swelling, tenderness, deformity or signs of injury. Normal range of motion.      Cervical back: Normal range of motion and neck supple. No rigidity or tenderness.   Skin:     General: Skin is warm and dry.      Coloration: Skin is not jaundiced or pale.      Findings: No bruising, erythema, lesion or rash.   Neurological:      General: No focal deficit present.      Mental Status: He is alert and oriented to person, place, and time.      Cranial Nerves: No cranial nerve deficit.      Sensory: No sensory deficit.      Motor: No weakness.      Coordination: Coordination normal.   Psychiatric:         Mood and Affect: Mood normal.         Behavior: Behavior normal.          PAT AIRWAY:   Airway:     Mallampati::  II    Neck ROM::  Full   No broken teeth, no dentures and no missing teeth            Visit Vitals  /60   Pulse 65   Temp 36.7 °C (98.1 °F)   Resp 18   Ht 1.715 m (5' 7.5\")   Wt 70 kg (154 lb 5.2 oz)   SpO2 100%   BMI 23.81 kg/m²   Smoking Status Never   BSA 1.83 m²      Lab Results   Component Value Date    WBC 5.6 12/06/2024    HGB 14.2 12/06/2024    HCT 42.2 12/06/2024    MCV 95 12/06/2024     12/06/2024      Lab Results   Component Value Date    GLUCOSE 104 (H) 12/06/2024    CALCIUM 9.3 12/06/2024     12/06/2024    K 4.8 12/06/2024    CO2 31 12/06/2024     12/06/2024    BUN 22 12/06/2024    CREATININE 1.51 (H) 12/06/2024                EKG 11/7/24  Normal sinus rhythm   Normal EKG "       DASI Risk Score      Flowsheet Row Questionnaire Series Submission from 11/8/2024 in [facility] with Generic Provider Mercedes   Can you take care of yourself (eat, dress, bathe, or use toilet)?  2.75  filed at 11/08/2024 1646   Can you walk indoors, such as around your house? 1.75  filed at 11/08/2024 1646   Can you walk a block or two on level ground?  2.75  filed at 11/08/2024 1646   Can you climb a flight of stairs or walk up a hill? 5.5  filed at 11/08/2024 1646   Can you run a short distance? 8  filed at 11/08/2024 1646   Can you do light work around the house like dusting or washing dishes? 2.7  filed at 11/08/2024 1646   Can you do moderate work around the house like vacuuming, sweeping floors or carrying groceries? 3.5  filed at 11/08/2024 1646   Can you do heavy work around the house like scrubbing floors or lifting and moving heavy furniture?  0  filed at 11/08/2024 1646   Can you do yard work like raking leaves, weeding or pushing a mower? 4.5  filed at 11/08/2024 1646   Can you have sexual relations? 0  filed at 11/08/2024 1646   Can you participate in moderate recreational activities like golf, bowling, dancing, doubles tennis or throwing a baseball or football? 6  filed at 11/08/2024 1646   Can you participate in strenous sports like swimming, singles tennis, football, basketball, or skiing? 0  filed at 11/08/2024 1646   DASI SCORE 37.45  filed at 11/08/2024 1646   METS Score (Will be calculated only when all the questions are answered) 7.3  filed at 11/08/2024 1646          Caprini DVT Assessment    No data to display       Modified Frailty Index    No data to display       CHADS2 Stroke Risk  Current as of just now        N/A 3 to 100%: High Risk   2 to < 3%: Medium Risk   0 to < 2%: Low Risk     Last Change: N/A          This score determines the patient's risk of having a stroke if the patient has atrial fibrillation.        This score is not applicable to this patient. Components are not  calculated.          Revised Cardiac Risk Index    No data to display       Apfel Simplified Score    No data to display       Risk Analysis Index Results This Encounter    No data found in the last 10 encounters.       Stop Bang Score      Flowsheet Row Questionnaire Series Submission from 11/8/2024 in [facility] with Generic Provider Mercedes   Do you snore loudly? 0  filed at 11/08/2024 1646   Do you often feel tired or fatigued after your sleep? 0  filed at 11/08/2024 1646   Has anyone ever observed you stop breathing in your sleep? 0  filed at 11/08/2024 1646   Do you have or are you being treated for high blood pressure? 1  filed at 11/08/2024 1646   Recent BMI (Calculated) 23.2  filed at 11/08/2024 1646   Is BMI greater than 35 kg/m2? 0=No  filed at 11/08/2024 1646   Age older than 50 years old? 1=Yes  filed at 11/08/2024 1646   Gender - Male 1=Yes  filed at 11/08/2024 1646          Prodigy: High Risk  Total Score: 20              Prodigy Age Score      Prodigy Gender Score          ARISCAT Score for Postoperative Pulmonary Complications    No data to display       Carias Perioperative Risk for Myocardial Infarction or Cardiac Arrest (CHET)    No data to display         Assessment and Plan:     Patient is a 69-year-old male scheduled for a Transurethral Prostate Resection  Possible Cystoscopy;Tissue Excision(psb) with Dr. Titus on 12/20/24.    Patient has no active cardiac symptoms.   Patient denies any chest pain, tightness, heaviness, pressure, radiating pain, palpitations, irregular heartbeats, lightheadedness, cough, congestion, shortness of breath, JOHNSON, PND, near syncope, weight loss or gain.     RCRI  1 (MI) , 6.0 % Risk of MACE    Cardiac:  HTN - cont metoprolol on dos   Encouraged lifestyle modifications, low-sodium diet, and increase activity as tolerated.  Monitor BP and follow up with managing physician for readings sustaining >140/90.    HLD - cont statin on dos     CAD - s/p MI; IVANIA Lcx in 2011;  cont ASA 81mg on dos             Endocrine:  Hypothyroidism - cont levothyroxine on dos     Renal:  CKDIIIB  10/31/24 crt 1.95; GFR 36  11/8/24 crt 1.69; GFR 43    Recommendations to avoid nephrotoxic drugs and carefully monitor fluid status to maintain euvolemia. Use dose adjusted medications as needed for the underlying level of renal function.     Hematology:  Patient instructed to ambulate as soon as possible postoperatively to decrease thromboembolic risk.   Initiate mechanical DVT prophylaxis as soon as possible and initiate chemical prophylaxis when deemed safe from a bleeding standpoint post surgery.     LABS: CBC, BMP, UA flex ordered    Followup:  LABS REVIEWED: consistent with known CKD otherwise unremarkable     STOP BANG: male, >50, HTN = 3    Caprini: 4    Risk assessment complete.  Patient is scheduled for a intermediate surgical risk procedure.        Preoperative medication instructions were provided and reviewed with the patient.  Any additional testing or evaluation was explained to the patient.  Nothing by mouth instructions were discussed and patient's questions were answered prior to conclusion to this encounter.  Patient verbalized understanding of preoperative instructions given in preadmission testing; discharge instructions available in EMR.    This note was dictated by a speech recognition.  Minor errors may have been detected in a speech recognition.

## 2024-12-06 NOTE — PREPROCEDURE INSTRUCTIONS
Medication List            Accurate as of December 6, 2024  2:12 PM. Always use your most recent med list.                allopurinol 100 mg tablet  Commonly known as: Zyloprim  Medication Adjustments for Surgery: Take on the morning of surgery     aspirin 81 mg chewable tablet  Medication Adjustments for Surgery: Take on the morning of surgery     atorvastatin 80 mg tablet  Commonly known as: Lipitor  TAKE 1 TABLET DAILY  Medication Adjustments for Surgery: Take on the morning of surgery     calcitriol 0.25 mcg capsule  Commonly known as: Rocaltrol  Medication Adjustments for Surgery: Do Not take on the morning of surgery     doxazosin 1 mg tablet  Commonly known as: Cardura  Medication Adjustments for Surgery: Take/Use as prescribed     metoprolol tartrate 25 mg tablet  Commonly known as: Lopressor  Take 1 tablet (25 mg) by mouth 2 times a day.  Medication Adjustments for Surgery: Take on the morning of surgery     Nitrostat 0.4 mg SL tablet  Generic drug: nitroglycerin  Medication Adjustments for Surgery: Take/Use as prescribed     PreserVision AREDS-2 250-90-40-1 mg tablet,chewable  Generic drug: vit C,C-Kj-gkawh-lutein-zeaxan  Additional Medication Adjustments for Surgery: Take last dose 7 days before surgery     Synthroid 100 mcg tablet  Generic drug: levothyroxine  TAKE 1 TABLET ONCE DAILY ASDIRECTED  Medication Adjustments for Surgery: Take on the morning of surgery     tamsulosin 0.4 mg 24 hr capsule  Commonly known as: Flomax  Medication Adjustments for Surgery: Take/Use as prescribed                            **Concerning above medication instructions, if medication is normally taken at night, continue normal schedule.**  **DO NOT TAKE NIGHT PRIOR AND MORNING OF SURGERY**    CONTACT SURGEON'S OFFICE IF YOU DEVELOP:  * Fever = 100.4 F   * New respiratory symptoms (e.g. cough, shortness of breath, respiratory distress, sore throat)  * Recent loss of taste or smell  *Flu like symptoms such as headache,  fatigue or gastrointestinal symptoms  * You develop any open sores, shingles, burning or painful urination   AND/OR:  * You no longer wish to have the surgery.  * Any other personal circumstances change that may lead to the need to cancel or defer this surgery.  *You were admitted to any hospital within one week of your planned procedure.    SMOKING:  *Quitting smoking can make a huge difference to your health and recovery from surgery.    *If you need help with quitting, call 2-814-QUIT-NOW.    THE DAY OF SURGERY:  *Do not eat any food after midnight the night before surgery.   *You must drink 13.5 ounces of clear liquids (i.e. water, black coffee (no milk or cream), tea, apple juice or electrolyte drinks (gatorade)) 2 hours before your arrival time.  *You may chew gum until 2 hours before your surgery    SURGICAL TIME  *You will be contacted between 2 p.m. and 6 p.m. the business day before your surgery with your arrival time.  *If you haven't received a call by 6pm, call [phone].  *Scheduled surgery times may change and you will be notified if this occurs-check your personal voicemail for any updates.    ON THE MORNING OF SURGERY:  *Wear comfortable, loose fitting clothing.   *Do not use moisturizers, creams, lotions or perfume.  *All jewelry and valuables should be left at home.  *Prosthetic devices such as contact lenses, hearing aids, dentures, eyelash extensions, hairpins and body piercing must be removed before surgery.    BRING WITH YOU:  *Photo ID and insurance card  *Current list of medicines and allergies  *Pacemaker/Defibrillator/Heart stent cards  *CPAP machine and mask  *Slings/splints/crutches  *Copy of your complete Advanced Directive/DHPOA-if applicable  *Neurostimulator implant remote    PARKING AND ARRIVAL:  *Check in at the Main Entrance desk and let them know you are here for surgery.  *You will be directed to the 2nd floor surgical waiting area.    AFTER OUTPATIENT SURGERY:  *A  responsible adult MUST accompany you at the time of discharge and stay with you for 24 hours after your surgery.  *You may NOT drive yourself home after surgery.  *You may use a taxi or ride sharing service ([employer], [employer]) to return home ONLY if you are accompanied by a friend or family member.  *Instructions for resuming your medications will be provided by your surgeon.

## 2024-12-06 NOTE — CPM/PAT H&P
"[facility]/PAT Evaluation       Name: Trent Sandhu (Trent Sandhu)  /Age: 1955/69 y.o.         Date of Consult: 24    Referring Provider: Dr. Titus    Surgery, Date, and Length:     Transurethral Prostate Resection  Possible Cystoscopy;Tissue Excision(psb)   24, 90MIN    Trent Sandhu is a 69 year-old male who presents to the [facility] for perioperative risk assessment prior to surgery.    Patient presents with a primary diagnosis of BPH with urinary retention. Pt has indwelling golden catheter since 24 at which point 2L of urine was removed from bladder; most recently changed on 24.  He denies dysuria, f/c/n/v or gross hematuria. Cystoscopy 24 shows possible bladder tumor on right posterior wall.  Pt wishes to proceed with surgery as planned.     This note was created in part upon personal review of patient's medical records.      Patient is scheduled to have Transurethral Prostate Resection  Possible Cystoscopy;Tissue Excision(psb)      Pt denies any past history of anesthetic complications such as PONV, awareness, prolonged sedation, dental damage, aspiration, cardiac arrest, difficult intubation, difficult I.V. access or unexpected hospital admissions.  NO malignant hyperthermia and or pseudocholinesterase deficiency.  No history of blood transfusions     The patient is not a Confucianist and will accept blood and blood products if medically indicated.   Type and screen NOT sent.     Past Medical History:   Diagnosis Date    ASHD (arteriosclerotic heart disease)     drug-eluting stent placed into the circumflex artery back in May 2011.    Benign prostatic hyperplasia     Cancer (Multi)     melanoma in eye, left eye    Cardiology follow-up encounter 2024    Nathan Gilmore DO    CKD (chronic kidney disease) stage 3, GFR 30-59 ml/min (Multi)     24 Cr 1.69 GFR 43    Encounter for pre-operative cardiovascular clearance     Dr. Gilmore \"Stable from a cardiac standpoint. No " "CP/anginal symptoms. No episodes of heart failure. Good functional capacity. Normal twelve-lead EKG. He may proceed with the prostate surgery at acceptable low cardiovascular risk.  He will likely need to stop his aspirin 1 week prior to the procedure & then restart postoperatively when it is felt bleeding this is acceptable.\"    H/O echocardiogram 09/11/2024    CONCLUSIONS:   1. Left ventricular ejection fraction is normal, by visual estimate at 55-60%.   2. Spectral Doppler shows an impaired relaxation pattern of left ventricular diastolic filling.   3. There is normal right ventricular global systolic function.   4. Right ventricular within normal limits.    H/O heart artery stent 05/2011    drug-eluting stent placed into the circumflex artery    Heart disease 5/2011    Hyperlipidemia     Hypertension     Myocardial infarction (Multi) 2011    Retention of urine     Plan: Transurethral Prostate Resection; Possible Cystoscopy;Tissue Excision 12/20/24       Past Surgical History:   Procedure Laterality Date    CORONARY ANGIOPLASTY  2011    EYE SURGERY Left 12/17/1997    HERNIA REPAIR  2022       Patient Sexual activity questions deferred to the physician.    Family History   Problem Relation Name Age of Onset    Heart attack Mother Madina Sandhu     Alzheimer's disease Mother Madina Sawchik     Heart disease Mother Madina Edson     Cancer Mother Madina Sandhu     Emphysema Father Narendra Shahidchiky     Lung disease Father Narendra Sawchik     Heart disease Maternal Grandmother Yazmin Bird      Social History     Socioeconomic History    Marital status:      Spouse name: Not on file    Number of children: Not on file    Years of education: Not on file    Highest education level: Not on file   Occupational History    Not on file   Tobacco Use    Smoking status: Never    Smokeless tobacco: Never   Vaping Use    Vaping status: Never Used   Substance and Sexual Activity    Alcohol use: Not Currently    Drug use: Never    Sexual " activity: Defer   Other Topics Concern    Not on file   Social History Narrative    Not on file     Social Drivers of Health     Financial Resource Strain: Not on file   Food Insecurity: Not on file   Transportation Needs: Not on file   Physical Activity: Not on file   Stress: Not on file   Social Connections: Not on file   Intimate Partner Violence: Not on file   Housing Stability: Not on file        Allergies   Allergen Reactions    Sulfa (Sulfonamide Antibiotics) Headache     severe headaches         Current Outpatient Medications:     allopurinol (Zyloprim) 100 mg tablet, Take 2 tablets (200 mg) by mouth once daily., Disp: , Rfl:     aspirin 81 mg chewable tablet, Chew 1 tablet (81 mg) once daily., Disp: , Rfl:     atorvastatin (Lipitor) 80 mg tablet, TAKE 1 TABLET DAILY, Disp: 90 tablet, Rfl: 3    calcitriol (Rocaltrol) 0.25 mcg capsule, Take 1 capsule (0.25 mcg) by mouth 3 (three) times a week. Mon, Wed, Fri, Disp: , Rfl:     doxazosin (Cardura) 1 mg tablet, Take 1 tablet (1 mg) by mouth early in the morning.., Disp: , Rfl:     metoprolol tartrate (Lopressor) 25 mg tablet, Take 1 tablet (25 mg) by mouth 2 times a day., Disp: 180 tablet, Rfl: 3    Synthroid 100 mcg tablet, TAKE 1 TABLET ONCE DAILY ASDIRECTED (Patient taking differently: Take 1 tablet (100 mcg) by mouth early in the morning..), Disp: 90 tablet, Rfl: 0    tamsulosin (Flomax) 0.4 mg 24 hr capsule, Take 1 capsule (0.4 mg) by mouth once daily., Disp: , Rfl:     vit C,L-Fr-wsqob-lutein-zeaxan (PreserVision AREDS-2) 250-90-40-1 mg tablet,chewable, Chew 1 tablet 2 times a day., Disp: , Rfl:     nitroglycerin (Nitrostat) 0.4 mg SL tablet, Place 1 tablet (0.4 mg) under the tongue every 5 minutes if needed for chest pain. (Patient not taking: Reported on 12/6/2024), Disp: , Rfl:          PAT ROS:   Constitutional:    no fever   no chills   no unexpected weight change  Neuro/Psych:    no numbness   no weakness   no light-headedness   no confusion  Eyes:     Prosthetic left eye   no discharge   no pain   vision loss (left eye)   no diplopia   no visual disturbance   use of corrective lenses  Ears:    no ear pain   no hearing loss   no tinnitus  Nose:    no nasal discharge   no sinus congestion   no epistaxis  Mouth:    no dental issues   no mouth pain   no oral bleeding   no mouth lesions  Throat:    no throat pain   no dysphagia  Neck:    no neck pain   no neck stiffness  Cardio:    Functional 4 Mets. Patient denies SOB walking up 2 flights of stairs   Walking 2 miles daily   no chest pain   no palpitations   no peripheral edema   no dyspnea   no JOHNSON  Respiratory:    no cough   no wheezing   no hemoptysis   no shortness of breath  Endocrine:    no cold intolerance   no heat intolerance  GI:    no abdominal distention   no abdominal pain   no constipation   no diarrhea   no nausea   no vomiting   no blood in stool  :    Cox catheter in place   difficulty urinating   no dysuria   no oliguria  Musculoskeletal:    no arthralgias   no myalgias   no decreased ROM  Hematologic:    does not bruise/bleed easily   no excessive bleeding   no history of blood transfusion   no blood clots  Skin:   no skin changes   no sores/wound   no rash      Physical Exam  Constitutional:       General: He is not in acute distress.     Appearance: Normal appearance. He is not ill-appearing, toxic-appearing or diaphoretic.   HENT:      Head: Normocephalic and atraumatic.      Nose: Nose normal. No congestion or rhinorrhea.      Mouth/Throat:      Mouth: Mucous membranes are moist.      Pharynx: No posterior oropharyngeal erythema.   Eyes:      Extraocular Movements: Extraocular movements intact.      Conjunctiva/sclera: Conjunctivae normal.      Comments: Left prosthetic eye   Cardiovascular:      Rate and Rhythm: Normal rate and regular rhythm.      Pulses: Normal pulses.      Heart sounds: Normal heart sounds. No murmur heard.     No friction rub. No gallop.   Pulmonary:      Effort:  "Pulmonary effort is normal. No respiratory distress.      Breath sounds: Normal breath sounds. No stridor. No wheezing, rhonchi or rales.   Abdominal:      General: Bowel sounds are normal. There is no distension.      Palpations: Abdomen is soft. There is no mass.      Tenderness: There is no abdominal tenderness. There is no guarding or rebound.      Hernia: No hernia is present.   Genitourinary:     Comments: Indwelling golden catheter in place and functioning   Musculoskeletal:         General: No swelling, tenderness, deformity or signs of injury. Normal range of motion.      Cervical back: Normal range of motion and neck supple. No rigidity or tenderness.   Skin:     General: Skin is warm and dry.      Coloration: Skin is not jaundiced or pale.      Findings: No bruising, erythema, lesion or rash.   Neurological:      General: No focal deficit present.      Mental Status: He is alert and oriented to person, place, and time.      Cranial Nerves: No cranial nerve deficit.      Sensory: No sensory deficit.      Motor: No weakness.      Coordination: Coordination normal.   Psychiatric:         Mood and Affect: Mood normal.         Behavior: Behavior normal.          PAT AIRWAY:   Airway:     Mallampati::  II    Neck ROM::  Full   No broken teeth, no dentures and no missing teeth            Visit Vitals  /60   Pulse 65   Temp 36.7 °C (98.1 °F)   Resp 18   Ht 1.715 m (5' 7.5\")   Wt 70 kg (154 lb 5.2 oz)   SpO2 100%   BMI 23.81 kg/m²   Smoking Status Never   BSA 1.83 m²      Lab Results   Component Value Date    WBC 5.6 12/06/2024    HGB 14.2 12/06/2024    HCT 42.2 12/06/2024    MCV 95 12/06/2024     12/06/2024      Lab Results   Component Value Date    GLUCOSE 104 (H) 12/06/2024    CALCIUM 9.3 12/06/2024     12/06/2024    K 4.8 12/06/2024    CO2 31 12/06/2024     12/06/2024    BUN 22 12/06/2024    CREATININE 1.51 (H) 12/06/2024                EKG 11/7/24  Normal sinus rhythm   Normal EKG "       DASI Risk Score      Flowsheet Row Questionnaire Series Submission from 11/8/2024 in [facility] Care with Generic Provider Mercedes   Can you take care of yourself (eat, dress, bathe, or use toilet)?  2.75  filed at 11/08/2024 1646   Can you walk indoors, such as around your house? 1.75  filed at 11/08/2024 1646   Can you walk a block or two on level ground?  2.75  filed at 11/08/2024 1646   Can you climb a flight of stairs or walk up a hill? 5.5  filed at 11/08/2024 1646   Can you run a short distance? 8  filed at 11/08/2024 1646   Can you do light work around the house like dusting or washing dishes? 2.7  filed at 11/08/2024 1646   Can you do moderate work around the house like vacuuming, sweeping floors or carrying groceries? 3.5  filed at 11/08/2024 1646   Can you do heavy work around the house like scrubbing floors or lifting and moving heavy furniture?  0  filed at 11/08/2024 1646   Can you do yard work like raking leaves, weeding or pushing a mower? 4.5  filed at 11/08/2024 1646   Can you have sexual relations? 0  filed at 11/08/2024 1646   Can you participate in moderate recreational activities like golf, bowling, dancing, doubles tennis or throwing a baseball or football? 6  filed at 11/08/2024 1646   Can you participate in strenous sports like swimming, singles tennis, football, basketball, or skiing? 0  filed at 11/08/2024 1646   DASI SCORE 37.45  filed at 11/08/2024 1646   METS Score (Will be calculated only when all the questions are answered) 7.3  filed at 11/08/2024 1646          Caprini DVT Assessment    No data to display       Modified Frailty Index    No data to display       CHADS2 Stroke Risk  Current as of just now        N/A 3 to 100%: High Risk   2 to < 3%: Medium Risk   0 to < 2%: Low Risk     Last Change: N/A          This score determines the patient's risk of having a stroke if the patient has atrial fibrillation.        This score is not applicable to this patient. Components are not  calculated.          Revised Cardiac Risk Index    No data to display       Apfel Simplified Score    No data to display       Risk Analysis Index Results This Encounter    No data found in the last 10 encounters.       Stop Bang Score      Flowsheet Row Questionnaire Series Submission from 11/8/2024 in [facility] with Generic Provider Mercedes   Do you snore loudly? 0  filed at 11/08/2024 1646   Do you often feel tired or fatigued after your sleep? 0  filed at 11/08/2024 1646   Has anyone ever observed you stop breathing in your sleep? 0  filed at 11/08/2024 1646   Do you have or are you being treated for high blood pressure? 1  filed at 11/08/2024 1646   Recent BMI (Calculated) 23.2  filed at 11/08/2024 1646   Is BMI greater than 35 kg/m2? 0=No  filed at 11/08/2024 1646   Age older than 50 years old? 1=Yes  filed at 11/08/2024 1646   Gender - Male 1=Yes  filed at 11/08/2024 1646          Prodigy: High Risk  Total Score: 20              Prodigy Age Score      Prodigy Gender Score          ARISCAT Score for Postoperative Pulmonary Complications    No data to display       Carias Perioperative Risk for Myocardial Infarction or Cardiac Arrest (CHET)    No data to display         Assessment and Plan:     Patient is a 69-year-old male scheduled for a Transurethral Prostate Resection  Possible Cystoscopy;Tissue Excision(psb) with Dr. Titus on 12/20/24.    Patient has no active cardiac symptoms.   Patient denies any chest pain, tightness, heaviness, pressure, radiating pain, palpitations, irregular heartbeats, lightheadedness, cough, congestion, shortness of breath, JOHNSON, PND, near syncope, weight loss or gain.     RCRI  1 (MI) , 6.0 % Risk of MACE    Cardiac:  HTN - cont metoprolol on dos   Encouraged lifestyle modifications, low-sodium diet, and increase activity as tolerated.  Monitor BP and follow up with managing physician for readings sustaining >140/90.    HLD - cont statin on dos     CAD - s/p MI; IVANIA Lcx in 2011;  cont ASA 81mg on dos             Endocrine:  Hypothyroidism - cont levothyroxine on dos     Renal:  CKDIIIB  10/31/24 crt 1.95; GFR 36  11/8/24 crt 1.69; GFR 43    Recommendations to avoid nephrotoxic drugs and carefully monitor fluid status to maintain euvolemia. Use dose adjusted medications as needed for the underlying level of renal function.     Hematology:  Patient instructed to ambulate as soon as possible postoperatively to decrease thromboembolic risk.   Initiate mechanical DVT prophylaxis as soon as possible and initiate chemical prophylaxis when deemed safe from a bleeding standpoint post surgery.     LABS: CBC, BMP, UA flex ordered    Followup:  LABS REVIEWED: consistent with known CKD otherwise unremarkable     STOP BANG: male, >50, HTN = 3    Caprini: 4    Risk assessment complete.  Patient is scheduled for a intermediate surgical risk procedure.        Preoperative medication instructions were provided and reviewed with the patient.  Any additional testing or evaluation was explained to the patient.  Nothing by mouth instructions were discussed and patient's questions were answered prior to conclusion to this encounter.  Patient verbalized understanding of preoperative instructions given in preadmission testing; discharge instructions available in EMR.    This note was dictated by a speech recognition.  Minor errors may have been detected in a speech recognition.

## 2024-12-08 LAB
BACTERIA UR CULT: ABNORMAL
BACTERIA UR CULT: ABNORMAL

## 2024-12-09 LAB
BACTERIA UR CULT: ABNORMAL
BACTERIA UR CULT: ABNORMAL

## 2024-12-11 ENCOUNTER — TELEPHONE (OUTPATIENT)
Dept: UROLOGY | Facility: HOSPITAL | Age: 69
End: 2024-12-11
Payer: MEDICARE

## 2024-12-11 DIAGNOSIS — R33.9 RETENTION OF URINE: Primary | ICD-10-CM

## 2024-12-11 RX ORDER — NITROFURANTOIN 25; 75 MG/1; MG/1
100 CAPSULE ORAL 2 TIMES DAILY
Qty: 10 CAPSULE | Refills: 0 | Status: SHIPPED | OUTPATIENT
Start: 2024-12-11 | End: 2024-12-20 | Stop reason: HOSPADM

## 2024-12-11 NOTE — TELEPHONE ENCOUNTER
----- Message from Bhanu Titus sent at 12/10/2024  4:31 PM EST -----  Macrobid #10 plz  ----- Message -----  From: Lab, Background User  Sent: 12/6/2024   3:39 PM EST  To: Bhanu Titus MD MPH

## 2024-12-13 ENCOUNTER — TELEPHONE (OUTPATIENT)
Dept: UROLOGY | Facility: HOSPITAL | Age: 69
End: 2024-12-13
Payer: MEDICARE

## 2024-12-13 NOTE — TELEPHONE ENCOUNTER
LM on cell with review of instructions for procedure on 12-20-24.  Patient is not on blood thinners.    Milana Barkley LPN

## 2024-12-20 ENCOUNTER — HOSPITAL ENCOUNTER (OUTPATIENT)
Facility: HOSPITAL | Age: 69
Setting detail: OUTPATIENT SURGERY
Discharge: HOME | End: 2024-12-20
Attending: STUDENT IN AN ORGANIZED HEALTH CARE EDUCATION/TRAINING PROGRAM | Admitting: STUDENT IN AN ORGANIZED HEALTH CARE EDUCATION/TRAINING PROGRAM
Payer: MEDICARE

## 2024-12-20 ENCOUNTER — ANESTHESIA (OUTPATIENT)
Dept: OPERATING ROOM | Facility: HOSPITAL | Age: 69
End: 2024-12-20
Payer: MEDICARE

## 2024-12-20 ENCOUNTER — ANESTHESIA EVENT (OUTPATIENT)
Dept: OPERATING ROOM | Facility: HOSPITAL | Age: 69
End: 2024-12-20
Payer: MEDICARE

## 2024-12-20 VITALS
TEMPERATURE: 96.8 F | WEIGHT: 157.63 LBS | BODY MASS INDEX: 23.35 KG/M2 | OXYGEN SATURATION: 100 % | HEIGHT: 69 IN | SYSTOLIC BLOOD PRESSURE: 137 MMHG | DIASTOLIC BLOOD PRESSURE: 67 MMHG | HEART RATE: 89 BPM | RESPIRATION RATE: 15 BRPM

## 2024-12-20 DIAGNOSIS — R33.9 RETENTION OF URINE: Primary | ICD-10-CM

## 2024-12-20 PROCEDURE — 52601 PROSTATECTOMY (TURP): CPT | Performed by: STUDENT IN AN ORGANIZED HEALTH CARE EDUCATION/TRAINING PROGRAM

## 2024-12-20 PROCEDURE — 3600000008 HC OR TIME - EACH INCREMENTAL 1 MINUTE - PROCEDURE LEVEL THREE: Performed by: STUDENT IN AN ORGANIZED HEALTH CARE EDUCATION/TRAINING PROGRAM

## 2024-12-20 PROCEDURE — 2500000004 HC RX 250 GENERAL PHARMACY W/ HCPCS (ALT 636 FOR OP/ED): Performed by: ANESTHESIOLOGY

## 2024-12-20 PROCEDURE — 3700000002 HC GENERAL ANESTHESIA TIME - EACH INCREMENTAL 1 MINUTE: Performed by: STUDENT IN AN ORGANIZED HEALTH CARE EDUCATION/TRAINING PROGRAM

## 2024-12-20 PROCEDURE — 3600000003 HC OR TIME - INITIAL BASE CHARGE - PROCEDURE LEVEL THREE: Performed by: STUDENT IN AN ORGANIZED HEALTH CARE EDUCATION/TRAINING PROGRAM

## 2024-12-20 PROCEDURE — 2500000004 HC RX 250 GENERAL PHARMACY W/ HCPCS (ALT 636 FOR OP/ED)

## 2024-12-20 PROCEDURE — 2720000007 HC OR 272 NO HCPCS: Performed by: STUDENT IN AN ORGANIZED HEALTH CARE EDUCATION/TRAINING PROGRAM

## 2024-12-20 PROCEDURE — 3700000001 HC GENERAL ANESTHESIA TIME - INITIAL BASE CHARGE: Performed by: STUDENT IN AN ORGANIZED HEALTH CARE EDUCATION/TRAINING PROGRAM

## 2024-12-20 PROCEDURE — 7100000010 HC PHASE TWO TIME - EACH INCREMENTAL 1 MINUTE: Performed by: STUDENT IN AN ORGANIZED HEALTH CARE EDUCATION/TRAINING PROGRAM

## 2024-12-20 PROCEDURE — 7100000001 HC RECOVERY ROOM TIME - INITIAL BASE CHARGE: Performed by: STUDENT IN AN ORGANIZED HEALTH CARE EDUCATION/TRAINING PROGRAM

## 2024-12-20 PROCEDURE — 2500000005 HC RX 250 GENERAL PHARMACY W/O HCPCS: Performed by: ANESTHESIOLOGY

## 2024-12-20 PROCEDURE — 7100000002 HC RECOVERY ROOM TIME - EACH INCREMENTAL 1 MINUTE: Performed by: STUDENT IN AN ORGANIZED HEALTH CARE EDUCATION/TRAINING PROGRAM

## 2024-12-20 PROCEDURE — 2500000005 HC RX 250 GENERAL PHARMACY W/O HCPCS: Performed by: STUDENT IN AN ORGANIZED HEALTH CARE EDUCATION/TRAINING PROGRAM

## 2024-12-20 PROCEDURE — 7100000009 HC PHASE TWO TIME - INITIAL BASE CHARGE: Performed by: STUDENT IN AN ORGANIZED HEALTH CARE EDUCATION/TRAINING PROGRAM

## 2024-12-20 RX ORDER — VANCOMYCIN HYDROCHLORIDE 1 G/200ML
1 INJECTION, SOLUTION INTRAVENOUS ONCE
Status: COMPLETED | OUTPATIENT
Start: 2024-12-20 | End: 2024-12-20

## 2024-12-20 RX ORDER — ROCURONIUM BROMIDE 10 MG/ML
INJECTION, SOLUTION INTRAVENOUS AS NEEDED
Status: DISCONTINUED | OUTPATIENT
Start: 2024-12-20 | End: 2024-12-20

## 2024-12-20 RX ORDER — FENTANYL CITRATE 50 UG/ML
INJECTION, SOLUTION INTRAMUSCULAR; INTRAVENOUS AS NEEDED
Status: DISCONTINUED | OUTPATIENT
Start: 2024-12-20 | End: 2024-12-20

## 2024-12-20 RX ORDER — NORETHINDRONE AND ETHINYL ESTRADIOL 0.5-0.035
KIT ORAL AS NEEDED
Status: DISCONTINUED | OUTPATIENT
Start: 2024-12-20 | End: 2024-12-20

## 2024-12-20 RX ORDER — SODIUM CHLORIDE, SODIUM LACTATE, POTASSIUM CHLORIDE, CALCIUM CHLORIDE 600; 310; 30; 20 MG/100ML; MG/100ML; MG/100ML; MG/100ML
100 INJECTION, SOLUTION INTRAVENOUS CONTINUOUS
Status: DISCONTINUED | OUTPATIENT
Start: 2024-12-20 | End: 2024-12-20 | Stop reason: HOSPADM

## 2024-12-20 RX ORDER — PHENYLEPHRINE HCL IN 0.9% NACL 1 MG/10 ML
SYRINGE (ML) INTRAVENOUS AS NEEDED
Status: DISCONTINUED | OUTPATIENT
Start: 2024-12-20 | End: 2024-12-20

## 2024-12-20 RX ORDER — ONDANSETRON HYDROCHLORIDE 2 MG/ML
INJECTION, SOLUTION INTRAVENOUS AS NEEDED
Status: DISCONTINUED | OUTPATIENT
Start: 2024-12-20 | End: 2024-12-20

## 2024-12-20 RX ORDER — LIDOCAINE HYDROCHLORIDE 20 MG/ML
INJECTION, SOLUTION INFILTRATION; PERINEURAL AS NEEDED
Status: DISCONTINUED | OUTPATIENT
Start: 2024-12-20 | End: 2024-12-20

## 2024-12-20 RX ORDER — MIDAZOLAM HYDROCHLORIDE 1 MG/ML
INJECTION INTRAMUSCULAR; INTRAVENOUS AS NEEDED
Status: DISCONTINUED | OUTPATIENT
Start: 2024-12-20 | End: 2024-12-20

## 2024-12-20 RX ORDER — PHENAZOPYRIDINE HYDROCHLORIDE 200 MG/1
200 TABLET, FILM COATED ORAL 3 TIMES DAILY PRN
Qty: 15 TABLET | Refills: 0 | Status: SHIPPED | OUTPATIENT
Start: 2024-12-20 | End: 2024-12-25

## 2024-12-20 RX ORDER — PROPOFOL 10 MG/ML
INJECTION, EMULSION INTRAVENOUS AS NEEDED
Status: DISCONTINUED | OUTPATIENT
Start: 2024-12-20 | End: 2024-12-20

## 2024-12-20 RX ORDER — CIPROFLOXACIN 2 MG/ML
400 INJECTION, SOLUTION INTRAVENOUS ONCE
Status: DISCONTINUED | OUTPATIENT
Start: 2024-12-20 | End: 2024-12-20

## 2024-12-20 RX ORDER — ACETAMINOPHEN 325 MG/1
650 TABLET ORAL EVERY 4 HOURS PRN
Status: DISCONTINUED | OUTPATIENT
Start: 2024-12-20 | End: 2024-12-20 | Stop reason: HOSPADM

## 2024-12-20 RX ORDER — GLYCOPYRROLATE 0.2 MG/ML
INJECTION INTRAMUSCULAR; INTRAVENOUS AS NEEDED
Status: DISCONTINUED | OUTPATIENT
Start: 2024-12-20 | End: 2024-12-20

## 2024-12-20 RX ORDER — VANCOMYCIN HYDROCHLORIDE 1 G/20ML
INJECTION, POWDER, LYOPHILIZED, FOR SOLUTION INTRAVENOUS DAILY PRN
Status: DISCONTINUED | OUTPATIENT
Start: 2024-12-20 | End: 2024-12-20

## 2024-12-20 RX ORDER — NEOSTIGMINE METHYLSULFATE 0.5 MG/ML
INJECTION INTRAVENOUS AS NEEDED
Status: DISCONTINUED | OUTPATIENT
Start: 2024-12-20 | End: 2024-12-20

## 2024-12-20 RX ORDER — SODIUM CHLORIDE 0.9 G/100ML
IRRIGANT IRRIGATION AS NEEDED
Status: DISCONTINUED | OUTPATIENT
Start: 2024-12-20 | End: 2024-12-20 | Stop reason: HOSPADM

## 2024-12-20 RX ORDER — LIDOCAINE HYDROCHLORIDE 10 MG/ML
0.1 INJECTION, SOLUTION EPIDURAL; INFILTRATION; INTRACAUDAL; PERINEURAL ONCE
Status: DISCONTINUED | OUTPATIENT
Start: 2024-12-20 | End: 2024-12-20 | Stop reason: HOSPADM

## 2024-12-20 RX ORDER — VANCOMYCIN HYDROCHLORIDE 1 G/200ML
1 INJECTION, SOLUTION INTRAVENOUS ONCE
Status: DISCONTINUED | OUTPATIENT
Start: 2024-12-20 | End: 2024-12-20

## 2024-12-20 RX ORDER — OXYCODONE HYDROCHLORIDE 5 MG/1
5 TABLET ORAL EVERY 4 HOURS PRN
Status: DISCONTINUED | OUTPATIENT
Start: 2024-12-20 | End: 2024-12-20 | Stop reason: HOSPADM

## 2024-12-20 RX ORDER — OXYBUTYNIN CHLORIDE 5 MG/1
5 TABLET ORAL 3 TIMES DAILY
Qty: 21 TABLET | Refills: 0 | Status: SHIPPED | OUTPATIENT
Start: 2024-12-20 | End: 2024-12-27

## 2024-12-20 RX ORDER — ACETAMINOPHEN 500 MG
1000 TABLET ORAL EVERY 6 HOURS PRN
Qty: 28 TABLET | Refills: 0 | Status: SHIPPED | OUTPATIENT
Start: 2024-12-20 | End: 2024-12-27

## 2024-12-20 RX ORDER — NITROFURANTOIN 25; 75 MG/1; MG/1
100 CAPSULE ORAL 2 TIMES DAILY
Qty: 6 CAPSULE | Refills: 0 | Status: SHIPPED | OUTPATIENT
Start: 2024-12-20 | End: 2024-12-23

## 2024-12-20 SDOH — HEALTH STABILITY: MENTAL HEALTH: CURRENT SMOKER: 0

## 2024-12-20 ASSESSMENT — COLUMBIA-SUICIDE SEVERITY RATING SCALE - C-SSRS
1. IN THE PAST MONTH, HAVE YOU WISHED YOU WERE DEAD OR WISHED YOU COULD GO TO SLEEP AND NOT WAKE UP?: NO
6. HAVE YOU EVER DONE ANYTHING, STARTED TO DO ANYTHING, OR PREPARED TO DO ANYTHING TO END YOUR LIFE?: NO
2. HAVE YOU ACTUALLY HAD ANY THOUGHTS OF KILLING YOURSELF?: NO

## 2024-12-20 ASSESSMENT — PAIN - FUNCTIONAL ASSESSMENT
PAIN_FUNCTIONAL_ASSESSMENT: 0-10
PAIN_FUNCTIONAL_ASSESSMENT: 0-10
PAIN_FUNCTIONAL_ASSESSMENT: UNABLE TO SELF-REPORT
PAIN_FUNCTIONAL_ASSESSMENT: 0-10

## 2024-12-20 ASSESSMENT — PAIN SCALES - GENERAL
PAINLEVEL_OUTOF10: 0 - NO PAIN
PAINLEVEL_OUTOF10: 0 - NO PAIN
PAINLEVEL_OUTOF10: 3

## 2024-12-20 NOTE — ANESTHESIA PREPROCEDURE EVALUATION
"Patient: Trent Sandhu    Procedure Information       Date/Time: 12/20/24 0730    Procedures:       Transurethral Prostate Resection (Prostate)      Possible Cystoscopy;Tissue Excision (Bladder)    Location: [facility] OR 01 / [facility] OR    Surgeons: Bhanu Titus MD MPH            Relevant Problems   Cardiac   (+) ASHD (arteriosclerotic heart disease)   (+) HTN (hypertension)   (+) Hyperlipemia       Clinical information reviewed:    Allergies                 Past Medical History:   Diagnosis Date   • ASHD (arteriosclerotic heart disease)     drug-eluting stent placed into the circumflex artery back in May 2011.   • Benign prostatic hyperplasia    • Cancer (Multi) 1997    melanoma in eye, left eye   • Cardiology follow-up encounter 11/07/2024    Nathan Gilmore DO   • CKD (chronic kidney disease) stage 3, GFR 30-59 ml/min (Multi)     11/8/24 Cr 1.69 GFR 43   • Encounter for pre-operative cardiovascular clearance     Dr. Gilmore \"Stable from a cardiac standpoint. No CP/anginal symptoms. No episodes of heart failure. Good functional capacity. Normal twelve-lead EKG. He may proceed with the prostate surgery at acceptable low cardiovascular risk.  He will likely need to stop his aspirin 1 week prior to the procedure & then restart postoperatively when it is felt bleeding this is acceptable.\"   • H/O echocardiogram 09/11/2024    CONCLUSIONS:   1. Left ventricular ejection fraction is normal, by visual estimate at 55-60%.   2. Spectral Doppler shows an impaired relaxation pattern of left ventricular diastolic filling.   3. There is normal right ventricular global systolic function.   4. Right ventricular within normal limits.   • H/O heart artery stent 05/2011    drug-eluting stent placed into the circumflex artery   • Heart disease 5/2011   • Hyperlipidemia    • Hypertension    • Myocardial infarction (Multi) 2011   • Retention of urine     Plan: Transurethral Prostate Resection; Possible Cystoscopy;Tissue Excision " "12/20/24      Past Surgical History:   Procedure Laterality Date   • CORONARY ANGIOPLASTY  2011   • EYE SURGERY Left 12/17/1997   • HERNIA REPAIR  2022     Social History     Tobacco Use   • Smoking status: Never   • Smokeless tobacco: Never   Vaping Use   • Vaping status: Never Used   Substance Use Topics   • Alcohol use: Not Currently   • Drug use: Never      Current Outpatient Medications   Medication Instructions   • allopurinol (ZYLOPRIM) 200 mg, Daily   • aspirin 81 mg chewable tablet 1 tablet, Daily   • atorvastatin (LIPITOR) 80 mg, oral, Daily   • calcitriol (Rocaltrol) 0.25 mcg capsule Take 1 capsule (0.25 mcg) by mouth 3 (three) times a week. Mon, Wed, Fri   • doxazosin (Cardura) 1 mg tablet 1 tablet, Daily (0630)   • metoprolol tartrate (LOPRESSOR) 25 mg, oral, 2 times daily   • nitroglycerin (Nitrostat) 0.4 mg SL tablet 1 tablet, Every 5 min PRN   • Synthroid 100 mcg, oral, Daily   • tamsulosin (Flomax) 0.4 mg 24 hr capsule 1 capsule, Daily   • vit C,P-Ln-xbiki-lutein-zeaxan (PreserVision AREDS-2) 250-90-40-1 mg tablet,chewable 1 tablet, 2 times daily      Allergies   Allergen Reactions   • Sulfa (Sulfonamide Antibiotics) Headache     severe headaches        Chemistry    Lab Results   Component Value Date/Time     12/06/2024 1455    K 4.8 12/06/2024 1455     12/06/2024 1455    CO2 31 12/06/2024 1455    BUN 22 12/06/2024 1455    CREATININE 1.51 (H) 12/06/2024 1455    Lab Results   Component Value Date/Time    CALCIUM 9.3 12/06/2024 1455    ALT 30 06/22/2021 0801          No results found for: \"HGBA1C\"  Lab Results   Component Value Date/Time    WBC 5.6 12/06/2024 1455    HGB 14.2 12/06/2024 1455    HCT 42.2 12/06/2024 1455     12/06/2024 1455     No results found for: \"PROTIME\", \"PTT\", \"INR\"  No results found for: \"ABORH\"  Encounter Date: 11/07/24   ECG 12 lead (Clinic Performed)    Narrative    Normal sinus rhythm  Normal EKG     No results found for this or any previous visit from the " past 1095 days.       Visit Vitals  Smoking Status Never     No data recorded    Physical Exam    Airway  Mallampati: II  TM distance: >3 FB  Neck ROM: full     Cardiovascular - normal exam     Dental - normal exam     Pulmonary - normal exam     Abdominal - normal exam         Anesthesia Plan    History of general anesthesia?: yes  History of complications of general anesthesia?: no    ASA 2     general     The patient is not a current smoker.    intravenous induction   Postoperative administration of opioids is intended.  Anesthetic plan and risks discussed with patient.  Use of blood products discussed with patient who.    Plan discussed with CAA and attending.

## 2024-12-20 NOTE — NURSING NOTE
1038 wife at bedside     1056 All DC instructions given to pt and wife     1100 Pt dressed with wife at bedside    1108 IV removed

## 2024-12-20 NOTE — PERIOPERATIVE NURSING NOTE
0918 Patient arrived to [facility] after procedure with Anesthesia and procedure RN, procedure discussed, plan reviewed, VSS    0930 Patient tolerating PO fluids and crackers     0941 Family updated on plan of care via text message    0947  at bedside    1015 Wife updated on plan of care via phone call

## 2024-12-20 NOTE — OP NOTE
Transurethral Prostate Resection Operative Note     Date: 2024  OR Location: [facility] A OR    Name: Trent Sandhu, : 1955, Age: 69 y.o., MRN: [id number], Sex: male    Diagnosis  Pre-op Diagnosis      * Retention of urine [R33.9] Post-op Diagnosis     * Retention of urine [R33.9]     Procedures  Transurethral Prostate Resection  47597 - [place] TRURL ELECTROSURG RESCJ PROSTATE BLEED COMPLETE      Surgeons      * Bhanu Titus - Primary    Resident/Fellow/Other Assistant:  Surgeons and Role:     * Kacy Estrada MD - Resident - Assisting    Staff:   Circulator: Emily Gonzales Person: Joana    Anesthesia Staff: Anesthesiologist: Skinny Patel MD  CRNA: LUCY Feliciano-GANESH    Procedure Summary  Anesthesia: General  ASA: II  Estimated Blood Loss: 10mL  Intra-op Medications:   Administrations occurring from 0730 to 0900 on 24:   Medication Name Total Dose   sodium chloride 0.9 % irrigation solution 6,000 mL   dexAMETHasone (Decadron) injection 4 mg/mL 8 mg   ePHEDrine injection 35 mg   fentaNYL (Sublimaze) injection 50 mcg/mL 100 mcg   glycopyrrolate (Robinul) injection 0.4 mg   LR bolus Cannot be calculated   lidocaine (Xylocaine) injection 2 % 100 mg   midazolam PF (Versed) injection 1 mg/mL 2 mg   neostigmine (Bloxiverz) 5 mg/10 mL injection 2.5 mg   ondansetron (Zofran) 2 mg/mL injection 4 mg   phenylephrine 100 mcg/mL syringe 10 mL (prefilled) 1,100 mcg   propofol (Diprivan) injection 10 mg/mL 200 mg   rocuronium (ZeMuron) 50 mg/5 mL injection 20 mg   vancomycin (Vancocin) 1 g in dextrose 5%  mL 1 g              Anesthesia Record               Intraprocedure I/O Totals          Intake    Neostigmine 0.00 mL    The total shown is the total volume documented since Anesthesia Start was filed.    Total Intake 0 mL          Specimen:   ID Type Source Tests Collected by Time   1 : PROSTATE CHIPS Tissue PROSTATE TURP SURGICAL PATHOLOGY EXAM Bhanu Titus MD [facility] 2024 0818                  Drains and/or Catheters:   Urethral Catheter Latex;Other (Comment) 22 Fr. (Active)       Findings: Moderate to severe hypertrophy of lateral and median lobes of prostate, with large opening of urinary channel after resection and excellent hemostasis.  Bladder lesion previously seen on an office cystoscopy was visualized and was likely secondary to Cox irritation.    Indications: Trent Sandhu is an 69 y.o. male who is having surgery for Retention of urine [R33.9] as well as lesion seen on an office cystoscopy.    The patient was seen in the preoperative area. The risks, benefits, complications, treatment options, non-operative alternatives, expected recovery and outcomes were discussed with the patient. The possibilities of reaction to medication, pulmonary aspiration, injury to surrounding structures, bleeding, recurrent infection, the need for additional procedures, failure to diagnose a condition, and creating a complication requiring transfusion or operation were discussed with the patient. The patient concurred with the proposed plan, giving informed consent.  The site of surgery was properly noted/marked if necessary per policy. The patient has been actively warmed in preoperative area. Preoperative antibiotics have been ordered and given within 1 hours of incision. Venous thrombosis prophylaxis have been ordered including bilateral sequential compression devices    Procedure Details:   After obtaining informed consent, the patient was brought to the operating room, placed on the operating table in supine position.  Preoperative time-out was performed.  Preoperative urine cultures were reviewed.  The patient received preoperative antibiotics. General anesthesia was then obtained, and the patient was repositioned into the dorsal lithotomy position and prepped and draped in usual sterile fashion.      A 26-Kenyan resectoscope using a visual obturator was inserted into the bladder via the urethra  atraumatically.  Pan-cystoscopy was performed.  Bladder irritation was noted on the right posterior bladder wall likely secondary to chronic catheter.  Given the look and area of bladder irritation we deferred biopsy.  The UOs were noted in normal orthotopic position.  Attention was then returned to the prostate.  We then used a bipolar thick loop to resect the prostatic adenoma, starting with the lateral lobes.  Care was taken to avoid resecting the areas by the ureteral orifice which were protected throughout our resection. Then, we systematically resected the left, then the right lateral lobes.  We then resected the median lobe.  Hemostasis was then obtained using the coagulation function of the bipolar loop.  At the end of the procedure, there was a TUR defect which was an excellent urinary channel that was open.  Hemostasis was achieved. Prostate chips were evacuated using the sheath of the resectoscope, and again any further hemostasis was obtained using the coagulation function of the bipolar loop.  At the conclusion of the procedure, a 22 Welsh three-way Golden catheter was then placed into the bladder with return of clear red urine.     The patient was awoken from general anesthesia and transferred to the PACU in stable condition.     Complications:  None; patient tolerated the procedure well.    Disposition: PACU - hemodynamically stable.  Condition: stable     Additional Details:   Patient to remove golden catheter POD5  3 Days of Abx (macrobid based on pos Ucx)  Pt will see me in office in 4 weeks   He will need a cysto in office to follow up on bladder mucosal changes     Attending Attestation: I was present and scrubbed for the entire procedure.    Bhanu Titus  Phone Number: [phone]

## 2024-12-20 NOTE — ANESTHESIA PROCEDURE NOTES
Airway  Date/Time: 12/20/2024 7:57 AM  Urgency: elective      Staffing  Performed: CRNA   Authorized by: Skinny Patel MD    Performed by: LUCY Feliciano-GANESH  Patient location during procedure: OR    Indications and Patient Condition  Indications for airway management: anesthesia  Sedation level: deep  Preoxygenated: yes      Final Airway Details  Final airway type: supraglottic airway      Successful airway: Size 4     Number of attempts at approach: 1    Additional Comments  Igel 4

## 2024-12-20 NOTE — ANESTHESIA POSTPROCEDURE EVALUATION
Patient: Trent Sandhu    Procedure Summary       Date: 12/20/24 Room / Location: [facility] A OR 01 / Virtual U A OR    Anesthesia Start: 0750 Anesthesia Stop: 0920    Procedure: Transurethral Prostate Resection (Prostate) Diagnosis:       Retention of urine      (Retention of urine [R33.9])    Surgeons: Bhanu Titus MD [facility] Responsible Provider: Skinny Patel MD    Anesthesia Type: general ASA Status: 2            Anesthesia Type: general    Vitals Value Taken Time   /64 12/20/24 0926   Temp 36.2 12/20/24 0926   Pulse 85 12/20/24 0926   Resp 18 12/20/24 0926   SpO2 95 % 12/20/24 0926   Vitals shown include unfiled device data.    Anesthesia Post Evaluation    Patient location during evaluation: PACU  Patient participation: complete - patient participated  Level of consciousness: awake  Pain management: adequate  Airway patency: patent  Cardiovascular status: acceptable  Respiratory status: acceptable  Hydration status: acceptable  Postoperative Nausea and Vomiting: none      No notable events documented.

## 2024-12-20 NOTE — DISCHARGE INSTRUCTIONS
DEPARTMENT OF UROLOGY  DISCHARGE INSTRUCTIONS TURP  Outpatient Surgery    C O N F I D E N T I A L   I N F O R M A T I O N    Trent Sandhu        Call [phone] during regular daytime business hours (8:00 am - 5:00 pm) and after 5:00 pm and ask for the Urology resident with any questions or concerns.      If it is a life-threatening situation, proceed to the nearest emergency department.        Follow-up appointment:    Future Appointments   Date Time Provider Department Center   1/14/2025  9:30 AM Bhanu Titus MD [facility]   5/8/2025 10:45 AM Nathan Gilmore DO [address]          Thank you for the opportunity to care for you today.  Your health and healing are very important to us.  We hope we made you feel as comfortable as possible and are committed to your recovery and continued well-being.      The following is a brief overview of your transurethral prostate resection today. Some of the information contained on this summary may be confidential.  This information should be kept in your records and should be shared with your regular doctor.    Physicians:   Dr. Cedric Titus      Procedure performed: Prostate Resection  Pending results:   pathology of tissue taken from your prostate    What to Expect During your Recovery and Home Care  Anesthesia Side Effects   You received anesthesia today.  You may feel sleepy, tired, or have a sore throat.   You may also feel drowsiness, dizziness, or inability to think clearly.  For your safety, do not drive, drink alcoholic beverages, take any unprescribed medication or make any important decisions for 24 hours.  A responsible adult should be with you for 24 hours.        Activity and Recovery    No heavy lifting over ten pounds. Limit activity while urinary catheter is in place. Avoid activities that would cause pulling or tugging on your catheter.    Do not drive or operate heavy machinery while taking narcotic pain medications as these medications can  alter perception, impair judgement, and slow reaction times.    Pain Control  Unfortunately, you may experience pain after your procedure.  Adequate management can include alternative measures to help ease your pain and can include over the counter Tylenol or oxycodone can be taken as prescribed as needed for breakthrough pain. Do not take more than 4,000mg of Tylenol in a 24-hour period.      You may also experience bladder spasms due to the catheter. Ditropan may be prescribed to help alleviate this problem.    Nausea/Vomiting   Clear liquids are best tolerated at first. Start slow, advance your diet as tolerated to normal foods. Avoid spicy, greasy, heavy foods at first. Also, you may feel nauseous or like you need to vomit if you take any type of medication on an empty stomach.  Call your physician if you are unable to eat or drink and have persistent vomiting.    Signs of Bleeding   You are going to have some blood in your urine. Your urine will be light pink to yellow. You always want to look at the urine in the tubing of your catheter and not in the large urine collection bag to check for bleeding. If urine becomes thick dark apryl red, has large clots or stops draining, please notify your physician.    Treatment/wound care:   Keep area(s) clean and dry. Clean around the tip or your penis were the catheter goes in daily with mild soap and water.  It is okay to shower 24 hours after time of surgery.    Do not submerge your catheter in standing water until seen for follow up appointment (no tub bathing, swimming, or hot tubs).      Signs of Infection  Signs of infection can include fever, drainage(green/yellow), chills, burning sensation with passing of urine, catheter leakage, or severe abdominal pain.  If you see any of these occur, please contact your doctor's office at [phone].  Any fever higher than 100.4, especially if associated with an ill feeling, abdominal pain, chills, or nausea should be  reported to your surgeon.      Assist in bowel movements/urination  Increase fiber in diet  Increase water (6 to 8 glasses)  Increase walking     Additional Instructions: CATHETER CARE  Always keep the catheters tubing and drainage bag below the level of your bladder.  Avoid loops and kinks in the catheter tubing.  NOTIFY your physician if catheter falls out or catheter seems clogged and urine is not draining.   Do not wear the small leg bag to bed you should be provided with a larger overnight bag that you should wear to bed and can hang over the side of the bed.  We recommend wearing the large bag in the shower, as this is easy to dry, and you do not get your leg straps wet from your leg bag.   Your catheter should be secured to your upper thigh, do not allow it to hang or dangle.  Your catheter will be removed at your post-operative appointment.

## 2024-12-23 ENCOUNTER — HOSPITAL ENCOUNTER (EMERGENCY)
Facility: HOSPITAL | Age: 69
Discharge: HOME | End: 2024-12-23
Payer: MEDICARE

## 2024-12-23 VITALS
HEART RATE: 75 BPM | HEIGHT: 69 IN | OXYGEN SATURATION: 98 % | WEIGHT: 162.92 LBS | SYSTOLIC BLOOD PRESSURE: 140 MMHG | BODY MASS INDEX: 24.13 KG/M2 | DIASTOLIC BLOOD PRESSURE: 63 MMHG | TEMPERATURE: 98.4 F | RESPIRATION RATE: 18 BRPM

## 2024-12-23 DIAGNOSIS — R33.8 ACUTE URINARY RETENTION: Primary | ICD-10-CM

## 2024-12-23 LAB
ALBUMIN SERPL BCP-MCNC: 4.4 G/DL (ref 3.4–5)
ALP SERPL-CCNC: 105 U/L (ref 33–136)
ALT SERPL W P-5'-P-CCNC: 19 U/L (ref 10–52)
ANION GAP SERPL CALCULATED.3IONS-SCNC: 12 MMOL/L (ref 10–20)
APPEARANCE UR: ABNORMAL
AST SERPL W P-5'-P-CCNC: 20 U/L (ref 9–39)
BACTERIA #/AREA URNS AUTO: ABNORMAL /HPF
BASOPHILS # BLD AUTO: 0.03 X10*3/UL (ref 0–0.1)
BASOPHILS NFR BLD AUTO: 0.4 %
BILIRUB SERPL-MCNC: 0.5 MG/DL (ref 0–1.2)
BILIRUB UR STRIP.AUTO-MCNC: NEGATIVE MG/DL
BUN SERPL-MCNC: 27 MG/DL (ref 6–23)
CALCIUM SERPL-MCNC: 9.7 MG/DL (ref 8.6–10.3)
CHLORIDE SERPL-SCNC: 103 MMOL/L (ref 98–107)
CO2 SERPL-SCNC: 28 MMOL/L (ref 21–32)
COLOR UR: ABNORMAL
CREAT SERPL-MCNC: 1.88 MG/DL (ref 0.5–1.3)
EGFRCR SERPLBLD CKD-EPI 2021: 38 ML/MIN/1.73M*2
EOSINOPHIL # BLD AUTO: 0.28 X10*3/UL (ref 0–0.4)
EOSINOPHIL NFR BLD AUTO: 4.2 %
ERYTHROCYTE [DISTWIDTH] IN BLOOD BY AUTOMATED COUNT: 13.2 % (ref 11.5–14.5)
GLUCOSE SERPL-MCNC: 117 MG/DL (ref 74–99)
GLUCOSE UR STRIP.AUTO-MCNC: NORMAL MG/DL
HCT VFR BLD AUTO: 41.5 % (ref 41–52)
HGB BLD-MCNC: 13.9 G/DL (ref 13.5–17.5)
IMM GRANULOCYTES # BLD AUTO: 0.03 X10*3/UL (ref 0–0.5)
IMM GRANULOCYTES NFR BLD AUTO: 0.4 % (ref 0–0.9)
KETONES UR STRIP.AUTO-MCNC: NEGATIVE MG/DL
LEUKOCYTE ESTERASE UR QL STRIP.AUTO: ABNORMAL
LYMPHOCYTES # BLD AUTO: 0.79 X10*3/UL (ref 0.8–3)
LYMPHOCYTES NFR BLD AUTO: 11.8 %
MCH RBC QN AUTO: 31.6 PG (ref 26–34)
MCHC RBC AUTO-ENTMCNC: 33.5 G/DL (ref 32–36)
MCV RBC AUTO: 94 FL (ref 80–100)
MONOCYTES # BLD AUTO: 0.7 X10*3/UL (ref 0.05–0.8)
MONOCYTES NFR BLD AUTO: 10.4 %
NEUTROPHILS # BLD AUTO: 4.87 X10*3/UL (ref 1.6–5.5)
NEUTROPHILS NFR BLD AUTO: 72.8 %
NITRITE UR QL STRIP.AUTO: NEGATIVE
NRBC BLD-RTO: 0 /100 WBCS (ref 0–0)
PH UR STRIP.AUTO: 5.5 [PH]
PLATELET # BLD AUTO: 116 X10*3/UL (ref 150–450)
POTASSIUM SERPL-SCNC: 3.9 MMOL/L (ref 3.5–5.3)
PROT SERPL-MCNC: 7.7 G/DL (ref 6.4–8.2)
PROT UR STRIP.AUTO-MCNC: ABNORMAL MG/DL
RBC # BLD AUTO: 4.4 X10*6/UL (ref 4.5–5.9)
RBC # UR STRIP.AUTO: ABNORMAL /UL
RBC #/AREA URNS AUTO: >20 /HPF
SODIUM SERPL-SCNC: 139 MMOL/L (ref 136–145)
SP GR UR STRIP.AUTO: 1
UROBILINOGEN UR STRIP.AUTO-MCNC: NORMAL MG/DL
WBC # BLD AUTO: 6.7 X10*3/UL (ref 4.4–11.3)
WBC #/AREA URNS AUTO: ABNORMAL /HPF
WBC CLUMPS #/AREA URNS AUTO: ABNORMAL /HPF

## 2024-12-23 PROCEDURE — 99283 EMERGENCY DEPT VISIT LOW MDM: CPT

## 2024-12-23 PROCEDURE — 87086 URINE CULTURE/COLONY COUNT: CPT | Mod: TRILAB | Performed by: PHYSICIAN ASSISTANT

## 2024-12-23 PROCEDURE — 80053 COMPREHEN METABOLIC PANEL: CPT | Performed by: PHYSICIAN ASSISTANT

## 2024-12-23 PROCEDURE — 81003 URINALYSIS AUTO W/O SCOPE: CPT | Performed by: PHYSICIAN ASSISTANT

## 2024-12-23 PROCEDURE — 51798 US URINE CAPACITY MEASURE: CPT

## 2024-12-23 PROCEDURE — 51702 INSERT TEMP BLADDER CATH: CPT

## 2024-12-23 PROCEDURE — 36415 COLL VENOUS BLD VENIPUNCTURE: CPT | Performed by: PHYSICIAN ASSISTANT

## 2024-12-23 PROCEDURE — 85025 COMPLETE CBC W/AUTO DIFF WBC: CPT | Performed by: PHYSICIAN ASSISTANT

## 2024-12-23 ASSESSMENT — PAIN DESCRIPTION - DESCRIPTORS: DESCRIPTORS: PRESSURE

## 2024-12-23 ASSESSMENT — PAIN DESCRIPTION - LOCATION: LOCATION: ABDOMEN

## 2024-12-23 ASSESSMENT — PAIN - FUNCTIONAL ASSESSMENT: PAIN_FUNCTIONAL_ASSESSMENT: 0-10

## 2024-12-23 ASSESSMENT — PAIN DESCRIPTION - FREQUENCY: FREQUENCY: CONSTANT/CONTINUOUS

## 2024-12-23 ASSESSMENT — PAIN DESCRIPTION - PROGRESSION: CLINICAL_PROGRESSION: NOT CHANGED

## 2024-12-23 ASSESSMENT — COLUMBIA-SUICIDE SEVERITY RATING SCALE - C-SSRS
6. HAVE YOU EVER DONE ANYTHING, STARTED TO DO ANYTHING, OR PREPARED TO DO ANYTHING TO END YOUR LIFE?: NO
1. IN THE PAST MONTH, HAVE YOU WISHED YOU WERE DEAD OR WISHED YOU COULD GO TO SLEEP AND NOT WAKE UP?: NO
2. HAVE YOU ACTUALLY HAD ANY THOUGHTS OF KILLING YOURSELF?: NO

## 2024-12-23 ASSESSMENT — PAIN DESCRIPTION - ONSET: ONSET: SUDDEN

## 2024-12-23 ASSESSMENT — PAIN SCALES - GENERAL
PAINLEVEL_OUTOF10: 0 - NO PAIN
PAINLEVEL_OUTOF10: 8

## 2024-12-23 ASSESSMENT — PAIN DESCRIPTION - PAIN TYPE: TYPE: ACUTE PAIN

## 2024-12-23 NOTE — ED TRIAGE NOTES
Triage Note:  Date of Encounter: [unfilled]   MRN: [id number]    I saw the patient as the clinician in triage and performed a brief history and physical exam established acuity and order appropriate test to develop basic plan of care.  Patient will be seen by LIBRADO and/or the physician who will independently evaluate  The patient.  Please see subsequent provider notes for further details and disposition      Brief HPI:   In brief, 69-year-old male here for urinary retention.  Recently had TURP procedure.  Took Cox out today without any urine passage since noon.    Focused physical exam:  Speaking clearly, no current distress.    Plan/MDM:  Labs, Cox        Please see subsequent provider note for details and disposition

## 2024-12-24 LAB — HOLD SPECIMEN: NORMAL

## 2024-12-24 NOTE — DISCHARGE INSTRUCTIONS
Follow-up with your urologist.  I recommend calling the office to schedule a follow-up appointment.  Ensure adequate hydration with water.  Please keep Cox catheter in place until you see urology.  If you begin to have pain around the catheter site, lower abdominal pain, fever, chills, foul-smelling urine return to the emergency department for reevaluation.

## 2024-12-25 LAB — BACTERIA UR CULT: NO GROWTH

## 2024-12-28 NOTE — ED PROVIDER NOTES
"HPI   Chief Complaint   Patient presents with    Post-op Problem     I had a prostate surgery on Friday and I removed my catheter today at 1215 and it was the last time I had urinary output I have pressure in my abd and I have distension in my abd        This is a 68-year-old male presenting to the emergency department for inability to urinate.  Patient had TURP procedure done on 12/20/2024 secondary to enlarged prostate.  This afternoon around 12 PM patient had catheter removed by his wife.  They were instructed to do so today.  Patient states since catheter removal he has not been able to urinate.  He states he is beginning to feel bladder distention.  He does not have any pain with urination.  No fever or chills.  No diarrhea, nausea or vomiting.      Please see HPI for pertinent positive and negative ROS.         Patient History   Past Medical History:   Diagnosis Date    ASHD (arteriosclerotic heart disease)     drug-eluting stent placed into the circumflex artery back in May 2011.    Benign prostatic hyperplasia     Cancer (Multi) 1997    melanoma in eye, left eye    Cardiology follow-up encounter 11/07/2024    Nathan Gilmore,     CKD (chronic kidney disease) stage 3, GFR 30-59 ml/min (Multi)     11/8/24 Cr 1.69 GFR 43    Encounter for pre-operative cardiovascular clearance     Dr. Gilmore \"Stable from a cardiac standpoint. No CP/anginal symptoms. No episodes of heart failure. Good functional capacity. Normal twelve-lead EKG. He may proceed with the prostate surgery at acceptable low cardiovascular risk.  He will likely need to stop his aspirin 1 week prior to the procedure & then restart postoperatively when it is felt bleeding this is acceptable.\"    H/O echocardiogram 09/11/2024    CONCLUSIONS:   1. Left ventricular ejection fraction is normal, by visual estimate at 55-60%.   2. Spectral Doppler shows an impaired relaxation pattern of left ventricular diastolic filling.   3. There is normal right ventricular " global systolic function.   4. Right ventricular within normal limits.    H/O heart artery stent 05/2011    drug-eluting stent placed into the circumflex artery    Heart disease 5/2011    Hyperlipidemia     Hypertension     Myocardial infarction (Multi) 2011    Retention of urine     Plan: Transurethral Prostate Resection; Possible Cystoscopy;Tissue Excision 12/20/24     Past Surgical History:   Procedure Laterality Date    CORONARY ANGIOPLASTY  2011    EYE SURGERY Left 12/17/1997    HERNIA REPAIR  2022     Family History   Problem Relation Name Age of Onset    Heart attack Mother Madina Sandhu     Alzheimer's disease Mother Madina Sawchik     Heart disease Mother Madina Shahidchiky     Cancer Mother Madina Shahidchiky     Emphysema Father Narendra Sawchik     Lung disease Father Narendra Sawchik     Heart disease Maternal Grandmother Yazmin Bird      Social History     Tobacco Use    Smoking status: Never    Smokeless tobacco: Never   Vaping Use    Vaping status: Never Used   Substance Use Topics    Alcohol use: Not Currently    Drug use: Never       Physical Exam   ED Triage Vitals [12/23/24 1708]   Temperature Heart Rate Respirations BP   36.9 °C (98.4 °F) 77 18 154/81      Pulse Ox Temp Source Heart Rate Source Patient Position   99 % Temporal Monitor Sitting      BP Location FiO2 (%)     Right arm --       Physical Exam  GENERAL APPEARANCE: Awake and alert. No acute respiratory distress.   VITAL SIGNS: As per the nurses' triage record.  HEENT: Normocephalic, atraumatic.   NECK: Soft, nontender and supple  CHEST: Nontender to palpation. Clear to auscultation bilaterally. Symmetric rise and fall of chest wall.   HEART: Clear S1 and S2. Regular rate and rhythm. Strong and equal pulses in the extremities.  ABDOMEN: Soft, nontender, nondistended  MUSCULOSKELETAL: Full gross active range of motion. Ambulating on own with no acute difficulties  NEUROLOGICAL: Awake, alert and oriented x 3. Motor power intact in the upper and lower extremities.  Sensation is intact to light touch in the upper and lower extremities. Patient answering questions appropriately.   IMMUNOLOGICAL: No lymphatic streaking noted  DERMATOLOGIC: Warm and dry  PYSCH: Cooperative with appropriate mood and affect.    ED Course & MDM   ED Course as of 12/28/24 0936   Mon Dec 23, 2024   2021 Did speak with urology on-call, Dr. Arguello.  We went over labs.  He recommends no antibiotics.  He recommends patient be discharged with Cox catheter and follow-up with his urologist on outpatient basis. [SH]      ED Course User Index  [SH] Shayna Rodriguez PA-C         Diagnoses as of 12/28/24 0936   Acute urinary retention                 No data recorded     Yun Coma Scale Score: 15 (12/23/24 1704 : Jacky Mathis, EMT)                           Medical Decision Making  Parts of this chart have been completed using voice recognition software. Please excuse any errors of transcription.  My thought process and reason for plan has been formulated from the time that I saw the patient until the time of disposition and is not specific to one specific moment during their visit and furthermore my MDM encompasses this entire chart and not only this text box.      HPI: Detailed above.    Exam: A medically appropriate exam performed, outlined above, given the known history and presentation.    History obtained from: Patient and patient's wife who is present with him.    Medications given during visit:  Medications - No data to display     Diagnostic/tests  Labs Reviewed   CBC WITH AUTO DIFFERENTIAL - Abnormal       Result Value    WBC 6.7      nRBC 0.0      RBC 4.40 (*)     Hemoglobin 13.9      Hematocrit 41.5      MCV 94      MCH 31.6      MCHC 33.5      RDW 13.2      Platelets 116 (*)     Neutrophils % 72.8      Immature Granulocytes %, Automated 0.4      Lymphocytes % 11.8      Monocytes % 10.4      Eosinophils % 4.2      Basophils % 0.4      Neutrophils Absolute 4.87      Immature Granulocytes Absolute,  Automated 0.03      Lymphocytes Absolute 0.79 (*)     Monocytes Absolute 0.70      Eosinophils Absolute 0.28      Basophils Absolute 0.03     COMPREHENSIVE METABOLIC PANEL - Abnormal    Glucose 117 (*)     Sodium 139      Potassium 3.9      Chloride 103      Bicarbonate 28      Anion Gap 12      Urea Nitrogen 27 (*)     Creatinine 1.88 (*)     eGFR 38 (*)     Calcium 9.7      Albumin 4.4      Alkaline Phosphatase 105      Total Protein 7.7      AST 20      Bilirubin, Total 0.5      ALT 19     URINALYSIS WITH REFLEX CULTURE AND MICROSCOPIC - Abnormal    Color, Urine Light-Brown (*)     Appearance, Urine Turbid (*)     Specific Gravity, Urine 1.004 (*)     pH, Urine 5.5      Protein, Urine 30 (1+) (*)     Glucose, Urine Normal      Blood, Urine OVER (3+) (*)     Ketones, Urine NEGATIVE      Bilirubin, Urine NEGATIVE      Urobilinogen, Urine Normal      Nitrite, Urine NEGATIVE      Leukocyte Esterase, Urine 75 Aniyah/µL (*)     Narrative:     OVER is reported when the result is greater than the clinically reportable range.   MICROSCOPIC ONLY, URINE - Abnormal    WBC, Urine 11-20 (*)     WBC Clumps, Urine OCCASIONAL      RBC, Urine >20 (*)     Bacteria, Urine 1+ (*)    URINE CULTURE - Normal    Urine Culture No growth     URINALYSIS WITH REFLEX CULTURE AND MICROSCOPIC    Narrative:     The following orders were created for panel order Urinalysis with Reflex Culture and Microscopic.  Procedure                               Abnormality         Status                     ---------                               -----------         ------                     Urinalysis with Reflex C...[[id number]]  Abnormal            Final result               Extra Urine Gray Tube[[id number]]                            Final result                 Please view results for these tests on the individual orders.   EXTRA URINE GRAY TUBE    Extra Tube Hold for add-ons.        No orders to display        Considerations/further MDM:    69-year-old male  presenting with acute urinary retention.  TURP procedure on 12/20/2024.  He had his Cox removed today.  Has not been able to urinate since.  Bladder scan shows 800 mL in bladder postvoid.  Cox catheter was placed with drainage of 1200 cc of urine.  Urinalysis shows 75 leukocytes with 3+ blood.  CBC shows no acute anemia.  No leukocytosis.  Mild thrombocytopenia.     CMP shows stable renal function.    Did speak with urology on-call who recommends discharge with Cox catheter and close outpatient follow-up with his urologist.  I did explain this to patient and his wife and they felt comfortable discharge plan home.  Patient was discharged in stable condition.  Return precautions were discussed.    Procedure  Procedures     Shayna Rodriguez PA-C  12/28/24 0950

## 2025-01-02 LAB
LABORATORY COMMENT REPORT: NORMAL
PATH REPORT.FINAL DX SPEC: NORMAL
PATH REPORT.GROSS SPEC: NORMAL
PATH REPORT.RELEVANT HX SPEC: NORMAL
PATH REPORT.TOTAL CANCER: NORMAL

## 2025-01-06 NOTE — PROGRESS NOTES
Subjective   Patient ID: Trent Sandhu is a 69 y.o. male    HPI  69 y.o. male who presents for pathology results 3 weeks s/p TURP (12/20/2024) with bladder outlet obstruction, urinary retention with subsequent dieudonne hydro and elevated creatinine. Most recent CT (10/07/2024) revealed Prostate hypertrophy demonstrated. Mild right-sided hydronephrosis significantly improved from the ultrasound dated 09/20/2024. No definite ureterolithiasis with characterization distally limited by multiple calcified phleboliths in the lower pelvis. Marked circumferential bladder wall thickening measuring 2.1 cm with stranding of the perivesical fat. Correlate with patient's history and further workup recommended.    Today he presented to the ED on 12/23/2024 for inability to urinate. Around noon that day he had catheter removed by his wife.  Patient stated since catheter removal he has not been able to urinate.  He stated he is began to feel bladder distention. He did not have any pain with urination.  No fever or chills.  No diarrhea, nausea or vomiting. He was discharged with Cox placed.    The most recent Surgical Pathology Exam, conducted on 12/20/2024, revealed:  A.  Prostate chips (transurethral resection):  Prostatic tissue with glandular hyperplasia and stromal hypertrophy.       Review of Systems    All systems were reviewed. Anything negative was noted in the HPI.    Objective   Physical Exam    General: Well developed, well nourished, alert and cooperative, appears in no acute distress   Eyes: Non-injected conjunctiva, sclera clear, no proptosis   Cardiac: Extremities are warm and well perfused. No edema, cyanosis or pallor   Lungs: Breathing is easy, non-labored. Speaking in clear and complete sentences. Normal diaphragmatic movement   MSK: Ambulatory with steady gait, unassisted   Neuro: Alert and oriented to person, place, and time   Psych: Demonstrates good judgment and reason, without hallucinations, abnormal affect or  "abnormal behaviors   Skin: No obvious lesions, no rashes       No CVA tenderness bilaterally   No suprapubic pain or discomfort       Past Medical History:   Diagnosis Date    ASHD (arteriosclerotic heart disease)     drug-eluting stent placed into the circumflex artery back in May 2011.    Benign prostatic hyperplasia     Cancer (Multi) 1997    melanoma in eye, left eye    Cardiology follow-up encounter 11/07/2024    Nathan Gilmore, DO    CKD (chronic kidney disease) stage 3, GFR 30-59 ml/min (Multi)     11/8/24 Cr 1.69 GFR 43    Encounter for pre-operative cardiovascular clearance     Dr. Gilmore \"Stable from a cardiac standpoint. No CP/anginal symptoms. No episodes of heart failure. Good functional capacity. Normal twelve-lead EKG. He may proceed with the prostate surgery at acceptable low cardiovascular risk.  He will likely need to stop his aspirin 1 week prior to the procedure & then restart postoperatively when it is felt bleeding this is acceptable.\"    H/O echocardiogram 09/11/2024    CONCLUSIONS:   1. Left ventricular ejection fraction is normal, by visual estimate at 55-60%.   2. Spectral Doppler shows an impaired relaxation pattern of left ventricular diastolic filling.   3. There is normal right ventricular global systolic function.   4. Right ventricular within normal limits.    H/O heart artery stent 05/2011    drug-eluting stent placed into the circumflex artery    Heart disease 5/2011    Hyperlipidemia     Hypertension     Myocardial infarction (Multi) 2011    Retention of urine     Plan: Transurethral Prostate Resection; Possible Cystoscopy;Tissue Excision 12/20/24         Past Surgical History:   Procedure Laterality Date    CORONARY ANGIOPLASTY  2011    EYE SURGERY Left 12/17/1997    HERNIA REPAIR  2022         Assessment/Plan   Retention of urine, sp TURP, failed TOV today, could not do CIC    69 y.o. male who presents for the above condition, Today, we had a very long and extensive discussion with " the patient regarding the pathophysiology, differential diagnosis, risk factor, associated condition, diagnostic work-up and management of BPH and lower urinary tract symptoms and acute urinary retention. I discussed with the patient the need to check his PSA to assess his prostate cancer risk. We discussed at length the mechanism of action, risk, benefit, adverse events and side effect of TURP and TURBT. Also discussed retrograde ejaculation of the side effects of the medication. We had another discussion with the patient regarding lifestyle modifications including low fluid intake after 5 PM, timed voiding every 2 hours, and decrease caffeine intake. I discussed with the patient that in case he had an elevated PSA, we will proceed do an MRI of the prostate.    His Cox catheter was removed today for voiding trial, I explained to him there is a possibility he will not be able to urinate on his own. We discussed if he is unable to urinate by 3-4pm or experiencing pain, pressure or dribbling only, he will need to proceed to the emergency room for another Ocx catheter placement. We discussed gross hematuria and dysuria are normal following Cox removal. Patient verbalized understanding.     Post-void residual volume revealed 500 ml    - Educated pt on how to self cath, instructed pt to self cath at least 4 to 5 times a day.  - Advised pt to present to ED if he experienced any fever, chills, or shivering.     Plan:  - Follow up in 2 weeks with NP Barbara Cordoba for voiding trial        E&M visit today is associated with current or anticipated ongoing medical care services related to a patient's single, serious condition or a complex condition.     1/7/2025    Scribe Attestation  By signing my name below, I, Anthony Salcido attest that this documentation has been prepared under the direction and in the presence of Dr. Bhanu Titus.

## 2025-01-07 ENCOUNTER — APPOINTMENT (OUTPATIENT)
Dept: UROLOGY | Facility: CLINIC | Age: 70
End: 2025-01-07
Payer: MEDICARE

## 2025-01-07 ENCOUNTER — OFFICE VISIT (OUTPATIENT)
Dept: UROLOGY | Facility: HOSPITAL | Age: 70
End: 2025-01-07
Payer: MEDICARE

## 2025-01-07 DIAGNOSIS — R33.9 RETENTION OF URINE: ICD-10-CM

## 2025-01-07 PROCEDURE — 1157F ADVNC CARE PLAN IN RCRD: CPT | Performed by: STUDENT IN AN ORGANIZED HEALTH CARE EDUCATION/TRAINING PROGRAM

## 2025-01-07 PROCEDURE — 1159F MED LIST DOCD IN RCRD: CPT | Performed by: STUDENT IN AN ORGANIZED HEALTH CARE EDUCATION/TRAINING PROGRAM

## 2025-01-07 PROCEDURE — 99024 POSTOP FOLLOW-UP VISIT: CPT | Performed by: STUDENT IN AN ORGANIZED HEALTH CARE EDUCATION/TRAINING PROGRAM

## 2025-01-07 PROCEDURE — 99214 OFFICE O/P EST MOD 30 MIN: CPT | Performed by: STUDENT IN AN ORGANIZED HEALTH CARE EDUCATION/TRAINING PROGRAM

## 2025-01-12 DIAGNOSIS — E78.2 MIXED HYPERLIPIDEMIA: ICD-10-CM

## 2025-01-12 DIAGNOSIS — Z00.00 HEALTH CARE MAINTENANCE: ICD-10-CM

## 2025-01-13 RX ORDER — ATORVASTATIN CALCIUM 80 MG/1
80 TABLET, FILM COATED ORAL DAILY
Qty: 90 TABLET | Refills: 3 | Status: SHIPPED | OUTPATIENT
Start: 2025-01-13

## 2025-01-14 ENCOUNTER — APPOINTMENT (OUTPATIENT)
Dept: UROLOGY | Facility: HOSPITAL | Age: 70
End: 2025-01-14
Payer: MEDICARE

## 2025-01-18 RX ORDER — LEVOTHYROXINE SODIUM 100 UG/1
100 TABLET ORAL DAILY
Qty: 90 TABLET | Refills: 0 | Status: SHIPPED | OUTPATIENT
Start: 2025-01-18

## 2025-01-20 NOTE — PROGRESS NOTES
[facility]  Outpatient Clinic Note    Patient Name:  Trent Sandhu  MRN:  [id number]  :  1955  Date of Service: 2025     Visit type: Follow up visit    HPI    Interval History:  Trent Sandhu is a 70 y.o. male who is being seen today for  problems listed below.     Problem list/Chief complaints:  Neurogenic bladder with urinary retention - s/p cystourethroscopy 24 with Dr. Titus; s/p TURP 24 with Dr. Titus.      10/31/24: NPV with Dr. Titus. 69 y.o. male who presents for bladder outlet obstruction, urinary retention with subsequent dieudonne hydro and elevated creatinine      The most recent CT abdomen pelvis wo IV contrast, conducted on 10/7/2024, revealed:  1. Marked circumferential bladder wall thickening measuring 2.1 cm  with stranding of the perivesical fat. Correlate with patient's  history and further workup recommended.      2. Prostate hypertrophy demonstrated.      3. Mild right-sided hydronephrosis significantly improved from the  ultrasound dated 2024. No definite ureterolithiasis with  characterization distally limited by multiple calcified phleboliths  in the lower pelvis.    24: S/p cystoscopy with Dr. Titus. IMPRESSION:  BPH. Possible bladder tumor right post wall    PLAN: TURP and TURBT    24: Patient presents for follow up and golden catheter exchange. He has no complaints, denies hematuria, no fever or chills.    24: S/p TURP with Dr. Titus    24: He presented to the ED on 2024 for inability to urinate. Around noon that day he had catheter removed by his wife.  Patient stated since catheter removal he has not been able to urinate.  He stated he is began to feel bladder distention. He did not have any pain with urination.  No fever or chills.  No diarrhea, nausea or vomiting. He was discharged with Golden placed.     25: 69 y.o. male who presents for pathology results 3 weeks s/p TURP (2024) with bladder outlet obstruction,  "urinary retention with subsequent dieudonne hydro and elevated creatinine.   His Cox catheter was removed today for voiding trial, I explained to him there is a possibility he will not be able to urinate on his own. We discussed if he is unable to urinate by 3-4pm or experiencing pain, pressure or dribbling only, he will need to proceed to the emergency room for another Cox catheter placement. We discussed gross hematuria and dysuria are normal following Cox removal. Patient verbalized understanding.      Post-void residual volume revealed 500 ml  - Educated pt on how to self cath, instructed pt to self cath at least 4 to 5 times a day.  - Advised pt to present to ED if he experienced any fever, chills, or shivering.     1/21/25: Patient presents for follow up. He states he is now ready to learn how to self cath. Cox catheter removed. Attempted to educate patient how to self cath but he was unsuccessful. Discussed with Dr. Titus. Patient should have TOV first to see if he is able to urinate before considering CIC. Patient will return later today for PVR and will re attempt CIC if he has urinary retention.    Past Medical History:   Diagnosis Date    ASHD (arteriosclerotic heart disease)     drug-eluting stent placed into the circumflex artery back in May 2011.    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 Gilmore \"Stable from a cardiac standpoint. No CP/anginal symptoms. No episodes of heart failure. Good functional capacity. Normal twelve-lead EKG. He may proceed with the prostate surgery at acceptable low cardiovascular risk.  He will likely need to stop his aspirin 1 week prior to the procedure & then restart postoperatively when it is felt bleeding this " "is acceptable.\"    H/O echocardiogram 09/11/2024    CONCLUSIONS:   1. Left ventricular ejection fraction is normal, by visual estimate at 55-60%.   2. Spectral Doppler shows an impaired relaxation pattern of left ventricular diastolic filling.   3. There is normal right ventricular global systolic function.   4. Right ventricular within normal limits.    H/O heart artery stent 05/2011    drug-eluting stent placed into the circumflex artery    Heart disease 5/2011    Hyperlipidemia     Hypertension     Myocardial infarction (Multi) 2011    Retention of urine     Plan: Transurethral Prostate Resection; Possible Cystoscopy;Tissue Excision 12/20/24       Past Surgical History:   Procedure Laterality Date    CORONARY ANGIOPLASTY  2011    EYE SURGERY Left 12/17/1997    HERNIA REPAIR  2022       Social History     Socioeconomic History    Marital status:      Spouse name: Not on file    Number of children: Not on file    Years of education: Not on file    Highest education level: Not on file   Occupational History    Not on file   Tobacco Use    Smoking status: Never    Smokeless tobacco: Never   Vaping Use    Vaping status: Never Used   Substance and Sexual Activity    Alcohol use: Not Currently    Drug use: Never    Sexual activity: Defer   Other Topics Concern    Not on file   Social History Narrative    Not on file     Social Drivers of Health     Financial Resource Strain: Not on file   Food Insecurity: Not on file   Transportation Needs: Not on file   Physical Activity: Not on file   Stress: Not on file   Social Connections: Not on file   Intimate Partner Violence: Not on file   Housing Stability: Not on file       Allergies   Allergen Reactions    Sulfa (Sulfonamide Antibiotics) Headache     severe headaches          Current Outpatient Medications:     allopurinol (Zyloprim) 100 mg tablet, Take 2 tablets (200 mg) by mouth once daily., Disp: , Rfl:     aspirin 81 mg chewable tablet, Chew 1 tablet (81 mg) once " daily., Disp: , Rfl:     atorvastatin (Lipitor) 80 mg tablet, TAKE 1 TABLET DAILY, Disp: 90 tablet, Rfl: 3    calcitriol (Rocaltrol) 0.25 mcg capsule, Take 1 capsule (0.25 mcg) by mouth 3 (three) times a week. Mon, Wed, Fri, Disp: , Rfl:     doxazosin (Cardura) 1 mg tablet, Take 1 tablet (1 mg) by mouth early in the morning.., Disp: , Rfl:     metoprolol tartrate (Lopressor) 25 mg tablet, Take 1 tablet (25 mg) by mouth 2 times a day., Disp: 180 tablet, Rfl: 3    nitroglycerin (Nitrostat) 0.4 mg SL tablet, Place 1 tablet (0.4 mg) under the tongue every 5 minutes if needed for chest pain. (Patient not taking: Reported on 12/20/2024), Disp: , Rfl:     Synthroid 100 mcg tablet, TAKE 1 TABLET ONCE DAILY ASDIRECTED, Disp: 90 tablet, Rfl: 0    tamsulosin (Flomax) 0.4 mg 24 hr capsule, Take 1 capsule (0.4 mg) by mouth once daily., Disp: , Rfl:     vit C,R-Yl-jjjmi-lutein-zeaxan (PreserVision AREDS-2) 250-90-40-1 mg tablet,chewable, Chew 1 tablet 2 times a day., Disp: , Rfl:      Review of system:  All other systems have been reviewed and are negative for complaints      Last recorded vitals:  There were no vitals taken for this visit.    Physical Exam:  General: Appears comfortable and in no apparent distress.  Head: Normocephalic, atraumatic  Eyes: Non-injected conjunctiva, sclera clear, no proptosis  Lungs: Breathing is easy, non-labored. Speaking in clear and complete sentences. Normal diaphragmatic movement.  Cardiovascular: no peripheral edema, cyanosis or pallor.   Abdomen: soft, non-distended, non-tender  : Bladder: non tender, not distended; Urethra and meatus: normal size, position, no lesions or discharge  Penis: normal (circumcised) phallus, no palpable plaque, no lesions/masses/deformity  MSK: Ambulatory with steady gait, unassisted  Skin: No visible rashes or lesions  Neurologic: Alert, oriented to person, place, and time  Psychiatric: mood and affect appropriate      Imaging  === 10/07/24 ===    CT ABDOMEN  PELVIS WO IV CONTRAST    - Impression -  1. Marked circumferential bladder wall thickening measuring 2.1 cm  with stranding of the perivesical fat. Correlate with patient's  history and further workup recommended.    2. Prostate hypertrophy demonstrated.    3. Mild right-sided hydronephrosis significantly improved from the  ultrasound dated 09/20/2024. No definite ureterolithiasis with  characterization distally limited by multiple calcified phleboliths  in the lower pelvis.      MACRO:  None    Signed by: Sarah Mix 10/9/2024 10:36 AM  Dictation workstation:   [id number]    PROCEDURE: Cystoscopy with Dr. Titus 11/5/24  The patient  was brought into the procedure suite and informed consent was reviewed and confirmed. Vital signs were obtained prior to the procedure: There were no vitals taken for this visit..  The patient was escorted onto the stretcher, placed supine, prepped with betadine and draped in the usual standard surgical fashion.  Intraurethral 2% viscous lidocaine jelly was used for local analgesia.  A 16 Azeri flexible cystourethroscope was inserted into the urethra.   The penile urethra was normal.  The prostate urethra was enlarged.  Upon entering the bladder the entire bladder was surveyed in a 360 degree fashion.  The left and right ureteral orifices were in normal orthotopic position effluxing clear yellow urine, bilaterally.   There was no evidence of any bladder lesions, foreign objects, stones or evidence of any mucosal changes. The cystoscope was then retroflexed.  The bladder neck was then further examined without any evidence of lesions. The scope was then removed and in an antegrade fashion, the urethra and bladder were again resurveyed with no evidence of additional lesions.  The cystoscope was then fully removed.   The patient tolerated the procedure well.  Vitals were stable after the procedure.  The patient was able to void and was discharged home.  Verbal and written Post  procedure instructions were reviewed with the patient.     IMPRESSION:  BPH  Possible bladder tumor right post wall      PLAN:  TURP and TURBT    Labs  Admission on 12/23/2024, Discharged on 12/23/2024   Component Date Value    WBC 12/23/2024 6.7     nRBC 12/23/2024 0.0     RBC 12/23/2024 4.40 (L)     Hemoglobin 12/23/2024 13.9     Hematocrit 12/23/2024 41.5     MCV 12/23/2024 94     MCH 12/23/2024 31.6     MCHC 12/23/2024 33.5     RDW 12/23/2024 13.2     Platelets 12/23/2024 116 (L)     Neutrophils % 12/23/2024 72.8     Immature Granulocytes %,* 12/23/2024 0.4     Lymphocytes % 12/23/2024 11.8     Monocytes % 12/23/2024 10.4     Eosinophils % 12/23/2024 4.2     Basophils % 12/23/2024 0.4     Neutrophils Absolute 12/23/2024 4.87     Immature Granulocytes Ab* 12/23/2024 0.03     Lymphocytes Absolute 12/23/2024 0.79 (L)     Monocytes Absolute 12/23/2024 0.70     Eosinophils Absolute 12/23/2024 0.28     Basophils Absolute 12/23/2024 0.03     Glucose 12/23/2024 117 (H)     Sodium 12/23/2024 139     Potassium 12/23/2024 3.9     Chloride 12/23/2024 103     Bicarbonate 12/23/2024 28     Anion Gap 12/23/2024 12     Urea Nitrogen 12/23/2024 27 (H)     Creatinine 12/23/2024 1.88 (H)     eGFR 12/23/2024 38 (L)     Calcium 12/23/2024 9.7     Albumin 12/23/2024 4.4     Alkaline Phosphatase 12/23/2024 105     Total Protein 12/23/2024 7.7     AST 12/23/2024 20     Bilirubin, Total 12/23/2024 0.5     ALT 12/23/2024 19     Color, Urine 12/23/2024 Light-Brown (N)     Appearance, Urine 12/23/2024 Turbid (N)     Specific Gravity, Urine 12/23/2024 1.004 (N)     pH, Urine 12/23/2024 5.5     Protein, Urine 12/23/2024 30 (1+) (A)     Glucose, Urine 12/23/2024 Normal     Blood, Urine 12/23/2024 OVER (3+) (A)     Ketones, Urine 12/23/2024 NEGATIVE     Bilirubin, Urine 12/23/2024 NEGATIVE     Urobilinogen, Urine 12/23/2024 Normal     Nitrite, Urine 12/23/2024 NEGATIVE     Leukocyte Esterase, Urine 12/23/2024 75 Aniyah/µL (A)     Extra Tube  12/23/2024 Hold for add-ons.     WBC, Urine 12/23/2024 11-20 (A)     WBC Clumps, Urine 12/23/2024 OCCASIONAL     RBC, Urine 12/23/2024 >20 (A)     Bacteria, Urine 12/23/2024 1+ (A)     Urine Culture 12/23/2024 No growth        Assessment and Plan:  Trent Sandhu is a 70 y.o. male with history of Neurogenic bladder with urinary retention s/p TURP with Dr. Titus on 12/20/24, failed TOV post op, presents for follow up and TOV.       Plan:  -Cox catheter removed with no issues  -Attempted to educate patient on CIC but he was unsuccessful  -Patient to return later today for PVR    All questions and concerns were addressed. Patient verbalizes understanding and has no other questions at this time.     Some elements copied from Dr. Titus's note on 1/7/25, the elements have been updated and all reflect current decision making from today, 01/21/25    E&M visit today is associated with current or anticipated ongoing medical care services related to a patient's single, serious condition or a complex condition.    LUCY Chavarria-CNP   [facility]  01/21/25 12:15 PM

## 2025-01-21 ENCOUNTER — OFFICE VISIT (OUTPATIENT)
Dept: UROLOGY | Facility: HOSPITAL | Age: 70
End: 2025-01-21
Payer: MEDICARE

## 2025-01-21 DIAGNOSIS — R33.9 RETENTION OF URINE: Primary | ICD-10-CM

## 2025-01-21 DIAGNOSIS — Z48.89 POSTOPERATIVE VISIT: ICD-10-CM

## 2025-01-21 PROCEDURE — 1126F AMNT PAIN NOTED NONE PRSNT: CPT | Performed by: NURSE PRACTITIONER

## 2025-01-21 PROCEDURE — 99213 OFFICE O/P EST LOW 20 MIN: CPT | Performed by: NURSE PRACTITIONER

## 2025-01-21 PROCEDURE — 1157F ADVNC CARE PLAN IN RCRD: CPT | Performed by: NURSE PRACTITIONER

## 2025-01-21 PROCEDURE — G2211 COMPLEX E/M VISIT ADD ON: HCPCS | Performed by: NURSE PRACTITIONER

## 2025-01-21 PROCEDURE — 1159F MED LIST DOCD IN RCRD: CPT | Performed by: NURSE PRACTITIONER

## 2025-01-21 PROCEDURE — 1160F RVW MEDS BY RX/DR IN RCRD: CPT | Performed by: NURSE PRACTITIONER

## 2025-01-21 PROCEDURE — 1036F TOBACCO NON-USER: CPT | Performed by: NURSE PRACTITIONER

## 2025-01-21 ASSESSMENT — PAIN SCALES - GENERAL: PAINLEVEL_OUTOF10: 0-NO PAIN

## 2025-02-04 ENCOUNTER — APPOINTMENT (OUTPATIENT)
Dept: UROLOGY | Facility: HOSPITAL | Age: 70
End: 2025-02-04
Payer: MEDICARE

## 2025-02-04 DIAGNOSIS — R33.9 RETENTION OF URINE: Primary | ICD-10-CM

## 2025-02-04 PROCEDURE — 1159F MED LIST DOCD IN RCRD: CPT | Performed by: UROLOGY

## 2025-02-04 PROCEDURE — 1036F TOBACCO NON-USER: CPT | Performed by: UROLOGY

## 2025-02-04 PROCEDURE — 99214 OFFICE O/P EST MOD 30 MIN: CPT | Performed by: UROLOGY

## 2025-02-04 PROCEDURE — 1157F ADVNC CARE PLAN IN RCRD: CPT | Performed by: UROLOGY

## 2025-02-04 RX ORDER — TAMSULOSIN HYDROCHLORIDE 0.4 MG/1
0.4 CAPSULE ORAL DAILY
Qty: 90 CAPSULE | Refills: 3 | Status: SHIPPED | OUTPATIENT
Start: 2025-02-04 | End: 2026-02-04

## 2025-02-04 NOTE — PROGRESS NOTES
"NPV     HISTORY OF PRESENT ILLNESS:   Trent Sandhu is a 70 y.o. male who is being seen today for consultation for neuromodulation    Pt with h/o urinary retention.  Was in ER back in Oct, golden placed for 1400cc.  Had seen Dr Arguello in the past. Was referred to Dr Titus.  CT scan showed enlarged prostate (approx 90g), thickened bladder wall, mild hydro.  Had TURP with Dr Titus on 12/20/24 (11g benign tissue).  Has failed TOV after x2.  Hasn't  been able to self cath.      PAST MEDICAL HISTORY:  Past Medical History:   Diagnosis Date    ASHD (arteriosclerotic heart disease)     drug-eluting stent placed into the circumflex artery back in May 2011.    Benign prostatic hyperplasia     Cancer (Multi) 1997    melanoma in eye, left eye    Cardiology follow-up encounter 11/07/2024    Nathan Gilmore,     CKD (chronic kidney disease) stage 3, GFR 30-59 ml/min (Multi)     11/8/24 Cr 1.69 GFR 43    Encounter for pre-operative cardiovascular clearance     Dr. Gilmore \"Stable from a cardiac standpoint. No CP/anginal symptoms. No episodes of heart failure. Good functional capacity. Normal twelve-lead EKG. He may proceed with the prostate surgery at acceptable low cardiovascular risk.  He will likely need to stop his aspirin 1 week prior to the procedure & then restart postoperatively when it is felt bleeding this is acceptable.\"    H/O echocardiogram 09/11/2024    CONCLUSIONS:   1. Left ventricular ejection fraction is normal, by visual estimate at 55-60%.   2. Spectral Doppler shows an impaired relaxation pattern of left ventricular diastolic filling.   3. There is normal right ventricular global systolic function.   4. Right ventricular within normal limits.    H/O heart artery stent 05/2011    drug-eluting stent placed into the circumflex artery    Heart disease 5/2011    Hyperlipidemia     Hypertension     Myocardial infarction (Multi) 2011    Retention of urine     Plan: Transurethral Prostate Resection; Possible " Cystoscopy;Tissue Excision 12/20/24       PAST SURGICAL HISTORY:  Past Surgical History:   Procedure Laterality Date    CORONARY ANGIOPLASTY  2011    EYE SURGERY Left 12/17/1997    HERNIA REPAIR  2022        ALLERGIES:   Allergies   Allergen Reactions    Sulfa (Sulfonamide Antibiotics) Headache     severe headaches        MEDICATIONS:   Current Outpatient Medications   Medication Instructions    allopurinol (ZYLOPRIM) 200 mg, Daily    aspirin 81 mg chewable tablet 1 tablet, Daily    atorvastatin (LIPITOR) 80 mg, oral, Daily    calcitriol (Rocaltrol) 0.25 mcg capsule Take 1 capsule (0.25 mcg) by mouth 3 (three) times a week. Mon, Wed, Fri    doxazosin (Cardura) 1 mg tablet 1 tablet, Daily (0630)    metoprolol tartrate (LOPRESSOR) 25 mg, oral, 2 times daily    nitroglycerin (Nitrostat) 0.4 mg SL tablet 1 tablet, Every 5 min PRN    Synthroid 100 mcg, oral, Daily    tamsulosin (Flomax) 0.4 mg 24 hr capsule 1 capsule, Daily    vit C,L-Ja-zbilj-lutein-zeaxan (PreserVision AREDS-2) 250-90-40-1 mg tablet,chewable 1 tablet, 2 times daily        PHYSICAL EXAM:  There were no vitals taken for this visit.  Constitutional: Patient appears well-developed and well-nourished. No distress.    Pulmonary/Chest: Effort normal. No respiratory distress.   Musculoskeletal: Normal range of motion.    Neurological: Alert and oriented to person, place, and time.  Psychiatric: Normal mood and affect. Behavior is normal. Thought content normal.      Labs  Lab Results   Component Value Date    CREATININE 1.88 (H) 12/23/2024     Lab Results   Component Value Date    CHOL 104 08/16/2024     Lab Results   Component Value Date    HDL 34.1 08/16/2024     Lab Results   Component Value Date    CHHDL 3.0 08/16/2024     Lab Results   Component Value Date    VLDL 27 08/16/2024     Lab Results   Component Value Date    TRIG 137 08/16/2024     Lab Results   Component Value Date    TESTF 46.3 05/25/2024     Lab Results   Component Value Date    HCT 41.5  12/23/2024       Assessment:      1. Retention of urine          Pt with h/o urinary retention.  Was in ER back in Oct, golden placed for 1400cc.  Had seen Dr Arguello in the past. Was referred to Dr Titus.  CT scan showed enlarged prostate (approx 90g), thickened bladder wall, mild hydro.  Had TURP with Dr Titus on 12/20/24 (11g benign tissue).  Has failed TOV after x2.  Hasn't  been able to self cath.       Plan:   1)  Unsure if this is truly non-obstructive urinary retention.  Recommend he undergo cystoscopy with Dr Carpenter to assess if possible HOLEP to remove more adenoma.  If open, recommend UDS to assess bladder function.  Neuromodulation discussed after those.

## 2025-02-06 ENCOUNTER — TELEPHONE (OUTPATIENT)
Dept: CARDIOLOGY | Facility: CLINIC | Age: 70
End: 2025-02-06

## 2025-02-06 DIAGNOSIS — I25.10 ASHD (ARTERIOSCLEROTIC HEART DISEASE): Primary | ICD-10-CM

## 2025-02-06 RX ORDER — NITROGLYCERIN 0.4 MG/1
0.4 TABLET SUBLINGUAL EVERY 5 MIN PRN
Qty: 25 TABLET | Refills: 3 | Status: SHIPPED | OUTPATIENT
Start: 2025-02-06

## 2025-02-06 NOTE — TELEPHONE ENCOUNTER
Rx Refill Request    Medication:     nitroglycerin  nitroglycerin (Nitrostat) 0.4 mg SL tablet    Pharmacy:     [facility] [place] - [facility] AT Portal to Registered [facility] Sites Phone: [phone]   Fax: [phone]

## 2025-02-10 NOTE — PROGRESS NOTES
HPI    70 y.o. male being seen with the following problem list:    Problem list:  Urinary retention      Pt with h/o urinary retention.  Was in ER back in Oct/24, golden placed for 1400cc.  Had seen Dr Arguello in the past. Was referred to Dr Titus.  CT scan showed enlarged prostate (approx 90g), thickened bladder wall, mild hydro.  Had TURP with Dr Titus on 12/20/24 (11g benign tissue).  Has failed TOV after x2.  Hasn't  been able to self cath.      02/12/25 - Here for a cysto to evaluate his channel.          Current Medications:  Current Outpatient Medications   Medication Sig Dispense Refill    allopurinol (Zyloprim) 100 mg tablet Take 2 tablets (200 mg) by mouth once daily.      aspirin 81 mg chewable tablet Chew 1 tablet (81 mg) once daily.      atorvastatin (Lipitor) 80 mg tablet TAKE 1 TABLET DAILY 90 tablet 3    calcitriol (Rocaltrol) 0.25 mcg capsule Take 1 capsule (0.25 mcg) by mouth 3 (three) times a week. Mon, Wed, Fri      doxazosin (Cardura) 1 mg tablet Take 1 tablet (1 mg) by mouth early in the morning..      metoprolol tartrate (Lopressor) 25 mg tablet Take 1 tablet (25 mg) by mouth 2 times a day. 180 tablet 3    nitroglycerin (Nitrostat) 0.4 mg SL tablet Place 1 tablet (0.4 mg) under the tongue every 5 minutes if needed for chest pain. 25 tablet 3    Synthroid 100 mcg tablet TAKE 1 TABLET ONCE DAILY ASDIRECTED 90 tablet 0    tamsulosin (Flomax) 0.4 mg 24 hr capsule Take 1 capsule (0.4 mg) by mouth once daily. 90 capsule 3    vit C,Q-Rf-qajat-lutein-zeaxan (PreserVision AREDS-2) 250-90-40-1 mg tablet,chewable Chew 1 tablet 2 times a day.       No current facility-administered medications for this visit.        Active Problems:  Trent Sandhu is a 70 y.o. male with the following Problems and Medications.  Patient Active Problem List   Diagnosis    ASHD (arteriosclerotic heart disease)    HTN (hypertension)    Hyperlipemia    Preop cardiovascular exam    Retention of urine     Current Outpatient  "Medications   Medication Sig Dispense Refill    allopurinol (Zyloprim) 100 mg tablet Take 2 tablets (200 mg) by mouth once daily.      aspirin 81 mg chewable tablet Chew 1 tablet (81 mg) once daily.      atorvastatin (Lipitor) 80 mg tablet TAKE 1 TABLET DAILY 90 tablet 3    calcitriol (Rocaltrol) 0.25 mcg capsule Take 1 capsule (0.25 mcg) by mouth 3 (three) times a week. Mon, Wed, Fri      doxazosin (Cardura) 1 mg tablet Take 1 tablet (1 mg) by mouth early in the morning..      metoprolol tartrate (Lopressor) 25 mg tablet Take 1 tablet (25 mg) by mouth 2 times a day. 180 tablet 3    nitroglycerin (Nitrostat) 0.4 mg SL tablet Place 1 tablet (0.4 mg) under the tongue every 5 minutes if needed for chest pain. 25 tablet 3    Synthroid 100 mcg tablet TAKE 1 TABLET ONCE DAILY ASDIRECTED 90 tablet 0    tamsulosin (Flomax) 0.4 mg 24 hr capsule Take 1 capsule (0.4 mg) by mouth once daily. 90 capsule 3    vit C,K-Du-mvusn-lutein-zeaxan (PreserVision AREDS-2) 250-90-40-1 mg tablet,chewable Chew 1 tablet 2 times a day.       No current facility-administered medications for this visit.       PMH:  Past Medical History:   Diagnosis Date    ASHD (arteriosclerotic heart disease)     drug-eluting stent placed into the circumflex artery back in May 2011.    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 Gilmore \"Stable from a cardiac standpoint. No CP/anginal symptoms. No episodes of heart failure. Good functional capacity. Normal twelve-lead EKG. He may proceed with the prostate surgery at acceptable low cardiovascular risk.  He will likely need to stop his aspirin 1 week prior to the procedure & then restart postoperatively when it is felt bleeding this is acceptable.\"    H/O " echocardiogram 09/11/2024    CONCLUSIONS:   1. Left ventricular ejection fraction is normal, by visual estimate at 55-60%.   2. Spectral Doppler shows an impaired relaxation pattern of left ventricular diastolic filling.   3. There is normal right ventricular global systolic function.   4. Right ventricular within normal limits.    H/O heart artery stent 05/2011    drug-eluting stent placed into the circumflex artery    Heart disease 5/2011    Hyperlipidemia     Hypertension     Myocardial infarction (Multi) 2011    Retention of urine     Plan: Transurethral Prostate Resection; Possible Cystoscopy;Tissue Excision 12/20/24       PSH:  Past Surgical History:   Procedure Laterality Date    CORONARY ANGIOPLASTY  2011    EYE SURGERY Left 12/17/1997    HERNIA REPAIR  2022       FMH:  Family History   Problem Relation Name Age of Onset    Heart attack Mother Madina Sandhu     Alzheimer's disease Mother Madina Sawchik     Heart disease Mother Madina Sawchik     Cancer Mother Madina Sawchik     Emphysema Father Narendra Sawchik     Lung disease Father Narendra Sawchik     Heart disease Maternal Grandmother Yazmin Bird        SHx:  Social History     Tobacco Use    Smoking status: Never    Smokeless tobacco: Never   Vaping Use    Vaping status: Never Used   Substance Use Topics    Alcohol use: Not Currently    Drug use: Never       Allergies:  Allergies   Allergen Reactions    Sulfa (Sulfonamide Antibiotics) Headache     severe headaches       Procedure:  After informed consent was obtained, the patient was taken to the procedure room for cystoscopy due to urinary retention.     Cystoscopy     Procedure Note:    A sterile prep and drape was performed in standard fashion. Lidocaine was used for topical anesthesia. A flexible cystoscope was inserted into the urethra without difficulty revealing normal urethra.     The prostate modest TUR channel near the bladder neck, a fair amount of residual obstructing prostate tissue    Then entered the  bladder revealing bladder mucosa with no erythematous patches or plaques, foreign bodies, stones or papillary lesions. The ureteral orifices were visualized bilaterally. These were orthotopic in location and effluxing clear urine. No masses were seen on retroflexion.     Post-Procedure:   The cystoscope was removed. The vital signs were stable . The patient tolerated the procedure well. There were no complications.          Assessment/Plan  70 male old with BPH 90g, and urinary retention, prior TURP 12/2024, unable to SIC since. Cysto shows modest two part channel near the bladder neck, obstructing tissue. I recommend completion HoLEP, can not guarantee that it will work but the alternative is to live with the cath    We discussed surgical options for his prostate and bothersome LUTS. We discussed various options including TURP, greenlight, Rezum, Urolift, HoLEP. We discussed HoLEP as a size-independent procedure that maximally de-obstructs the prostate with relatively less postop healing and recovery as compared to other modalities and superior durability. We discussed the surgery in detail. Discussed the risks of bleeding, infection, scarring, transient incontinence, rare (<1%) risk of long-term incontinence. Discussed the perioperative pathway. Discussed the near certainty of permanent ejaculatory dysfunction, but likely no change to his baseline erectile function or orgasm.     Discussed surgery may not help resolve his symptoms but offered to try to maximally de obstruct his prostate to try to get him emptying to safe levels. Again discussed can not guarantee this will work but if needing to catheterize would make SIC easier    5d cipro 500mg po bid called in prior to surgery. Cox change in 3 weeks.    He understands this and would like to proceed with surgery.     Avelinoibe Attestation  By signing my name below, I, Anthony Montgomery, attest that this documentation has been prepared under the direction and in  the presence of Collin Carpenter MD.

## 2025-02-12 ENCOUNTER — PREP FOR PROCEDURE (OUTPATIENT)
Dept: UROLOGY | Facility: HOSPITAL | Age: 70
End: 2025-02-12

## 2025-02-12 ENCOUNTER — PROCEDURE VISIT (OUTPATIENT)
Dept: UROLOGY | Facility: HOSPITAL | Age: 70
End: 2025-02-12
Payer: MEDICARE

## 2025-02-12 VITALS — HEART RATE: 67 BPM | DIASTOLIC BLOOD PRESSURE: 64 MMHG | SYSTOLIC BLOOD PRESSURE: 147 MMHG

## 2025-02-12 DIAGNOSIS — N13.9 OBSTRUCTIVE UROPATHY: ICD-10-CM

## 2025-02-12 DIAGNOSIS — N40.1 BENIGN PROSTATIC HYPERPLASIA WITH URINARY RETENTION: ICD-10-CM

## 2025-02-12 DIAGNOSIS — Z97.8 FOLEY CATHETER IN PLACE: Primary | ICD-10-CM

## 2025-02-12 DIAGNOSIS — R33.8 BENIGN PROSTATIC HYPERPLASIA WITH URINARY RETENTION: ICD-10-CM

## 2025-02-12 PROCEDURE — 99214 OFFICE O/P EST MOD 30 MIN: CPT | Performed by: UROLOGY

## 2025-02-12 PROCEDURE — 52000 CYSTOURETHROSCOPY: CPT | Performed by: UROLOGY

## 2025-02-12 PROCEDURE — G2211 COMPLEX E/M VISIT ADD ON: HCPCS | Performed by: UROLOGY

## 2025-02-12 PROCEDURE — 99214 OFFICE O/P EST MOD 30 MIN: CPT | Mod: 25 | Performed by: UROLOGY

## 2025-02-12 RX ORDER — CIPROFLOXACIN 500 MG/1
500 TABLET ORAL 2 TIMES DAILY
Qty: 16 TABLET | Refills: 0 | Status: SHIPPED | OUTPATIENT
Start: 2025-02-12 | End: 2025-02-20

## 2025-02-12 RX ORDER — CEFAZOLIN SODIUM 2 G/100ML
2 INJECTION, SOLUTION INTRAVENOUS ONCE
OUTPATIENT
Start: 2025-02-12 | End: 2025-02-12

## 2025-02-25 ENCOUNTER — CLINICAL SUPPORT (OUTPATIENT)
Dept: PREADMISSION TESTING | Facility: HOSPITAL | Age: 70
End: 2025-02-25
Payer: MEDICARE

## 2025-02-25 DIAGNOSIS — R33.8 BENIGN PROSTATIC HYPERPLASIA WITH URINARY RETENTION: ICD-10-CM

## 2025-02-25 DIAGNOSIS — N40.1 BENIGN PROSTATIC HYPERPLASIA WITH URINARY RETENTION: ICD-10-CM

## 2025-02-25 DIAGNOSIS — N13.9 OBSTRUCTIVE UROPATHY: ICD-10-CM

## 2025-03-03 ENCOUNTER — LAB (OUTPATIENT)
Dept: LAB | Facility: HOSPITAL | Age: 70
End: 2025-03-03
Payer: MEDICARE

## 2025-03-03 ENCOUNTER — PRE-ADMISSION TESTING (OUTPATIENT)
Dept: PREADMISSION TESTING | Facility: HOSPITAL | Age: 70
End: 2025-03-03
Payer: MEDICARE

## 2025-03-03 VITALS
OXYGEN SATURATION: 100 % | WEIGHT: 162.26 LBS | RESPIRATION RATE: 12 BRPM | TEMPERATURE: 98.1 F | SYSTOLIC BLOOD PRESSURE: 128 MMHG | DIASTOLIC BLOOD PRESSURE: 65 MMHG | HEIGHT: 65 IN | BODY MASS INDEX: 27.03 KG/M2 | HEART RATE: 67 BPM

## 2025-03-03 DIAGNOSIS — R33.8 BENIGN PROSTATIC HYPERPLASIA WITH URINARY RETENTION: ICD-10-CM

## 2025-03-03 DIAGNOSIS — I10 ESSENTIAL (PRIMARY) HYPERTENSION: Primary | ICD-10-CM

## 2025-03-03 DIAGNOSIS — R33.8 OTHER RETENTION OF URINE: ICD-10-CM

## 2025-03-03 DIAGNOSIS — I10 PRIMARY HYPERTENSION: Primary | ICD-10-CM

## 2025-03-03 DIAGNOSIS — N40.1 BENIGN PROSTATIC HYPERPLASIA WITH LOWER URINARY TRACT SYMPTOMS: ICD-10-CM

## 2025-03-03 DIAGNOSIS — N40.1 BENIGN PROSTATIC HYPERPLASIA WITH URINARY RETENTION: ICD-10-CM

## 2025-03-03 LAB
ANION GAP SERPL CALC-SCNC: 7 MMOL/L (ref 10–20)
APPEARANCE UR: CLEAR
BASOPHILS # BLD AUTO: 0.02 X10*3/UL (ref 0–0.1)
BASOPHILS NFR BLD AUTO: 0.3 %
BILIRUB UR STRIP.AUTO-MCNC: NEGATIVE MG/DL
BUN SERPL-MCNC: 25 MG/DL (ref 6–23)
CALCIUM SERPL-MCNC: 9.2 MG/DL (ref 8.6–10.3)
CHLORIDE SERPL-SCNC: 105 MMOL/L (ref 98–107)
CO2 SERPL-SCNC: 32 MMOL/L (ref 21–32)
COLOR UR: COLORLESS
CREAT SERPL-MCNC: 1.59 MG/DL (ref 0.5–1.3)
EGFRCR SERPLBLD CKD-EPI 2021: 46 ML/MIN/1.73M*2
EOSINOPHIL # BLD AUTO: 0.31 X10*3/UL (ref 0–0.4)
EOSINOPHIL NFR BLD AUTO: 5.3 %
ERYTHROCYTE [DISTWIDTH] IN BLOOD BY AUTOMATED COUNT: 13.6 % (ref 11.5–14.5)
GLUCOSE SERPL-MCNC: 100 MG/DL (ref 74–99)
GLUCOSE UR STRIP.AUTO-MCNC: NORMAL MG/DL
HCT VFR BLD AUTO: 39.2 % (ref 41–52)
HGB BLD-MCNC: 13.8 G/DL (ref 13.5–17.5)
IMM GRANULOCYTES # BLD AUTO: 0.02 X10*3/UL (ref 0–0.5)
IMM GRANULOCYTES NFR BLD AUTO: 0.3 % (ref 0–0.9)
KETONES UR STRIP.AUTO-MCNC: NEGATIVE MG/DL
LEUKOCYTE ESTERASE UR QL STRIP.AUTO: ABNORMAL
LYMPHOCYTES # BLD AUTO: 1.06 X10*3/UL (ref 0.8–3)
LYMPHOCYTES NFR BLD AUTO: 18.2 %
MCH RBC QN AUTO: 32.3 PG (ref 26–34)
MCHC RBC AUTO-ENTMCNC: 35.2 G/DL (ref 32–36)
MCV RBC AUTO: 92 FL (ref 80–100)
MONOCYTES # BLD AUTO: 0.59 X10*3/UL (ref 0.05–0.8)
MONOCYTES NFR BLD AUTO: 10.1 %
MUCOUS THREADS #/AREA URNS AUTO: ABNORMAL /LPF
NEUTROPHILS # BLD AUTO: 3.83 X10*3/UL (ref 1.6–5.5)
NEUTROPHILS NFR BLD AUTO: 65.8 %
NITRITE UR QL STRIP.AUTO: ABNORMAL
NRBC BLD-RTO: 0 /100 WBCS (ref 0–0)
PH UR STRIP.AUTO: 5 [PH]
PLATELET # BLD AUTO: 131 X10*3/UL (ref 150–450)
POTASSIUM SERPL-SCNC: 4.2 MMOL/L (ref 3.5–5.3)
PROT UR STRIP.AUTO-MCNC: NEGATIVE MG/DL
RBC # BLD AUTO: 4.27 X10*6/UL (ref 4.5–5.9)
RBC # UR STRIP.AUTO: ABNORMAL MG/DL
RBC #/AREA URNS AUTO: ABNORMAL /HPF
SODIUM SERPL-SCNC: 140 MMOL/L (ref 136–145)
SP GR UR STRIP.AUTO: 1
UROBILINOGEN UR STRIP.AUTO-MCNC: NORMAL MG/DL
WBC # BLD AUTO: 5.8 X10*3/UL (ref 4.4–11.3)
WBC #/AREA URNS AUTO: ABNORMAL /HPF

## 2025-03-03 PROCEDURE — 99204 OFFICE O/P NEW MOD 45 MIN: CPT | Performed by: NURSE PRACTITIONER

## 2025-03-03 PROCEDURE — 85025 COMPLETE CBC W/AUTO DIFF WBC: CPT

## 2025-03-03 PROCEDURE — 80048 BASIC METABOLIC PNL TOTAL CA: CPT

## 2025-03-03 PROCEDURE — 87086 URINE CULTURE/COLONY COUNT: CPT

## 2025-03-03 PROCEDURE — 81001 URINALYSIS AUTO W/SCOPE: CPT

## 2025-03-03 ASSESSMENT — ENCOUNTER SYMPTOMS
RESPIRATORY NEGATIVE: 1
MUSCULOSKELETAL NEGATIVE: 1
CONSTITUTIONAL NEGATIVE: 1
NEUROLOGICAL NEGATIVE: 1
CARDIOVASCULAR NEGATIVE: 1
NECK NEGATIVE: 1
GASTROINTESTINAL NEGATIVE: 1

## 2025-03-03 NOTE — CPM/PAT NURSE NOTE
"CPM/PAT Nurse Note      Name: Trent Sandhu (Trent Sandhu)  /Age: 1955/70 y.o.       Past Medical History:   Diagnosis Date    ASHD (arteriosclerotic heart disease)     drug-eluting stent placed into the circumflex artery back in May 2011.    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 Gilmore \"Stable from a cardiac standpoint for TURP 2024       No CP/anginal symptoms. No episodes of heart failure. Good functional capacity. Normal twelve-lead EKG. He may proceed with the prostate surgery at acceptable low cardiovascular risk.  He will likely need to stop his aspirin 1 week prior to the procedure & then restart postoperatively when it is felt bleeding this is acceptable.\"    H/O echocardiogram 2024    CONCLUSIONS:   1. Left ventricular ejection fraction is normal, by visual estimate at 55-60%.   2. Spectral Doppler shows an impaired relaxation pattern of left ventricular diastolic filling.   3. There is normal right ventricular global systolic function.   4. Right ventricular within normal limits.    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    Family History "   Problem Relation Name Age of Onset    Heart attack Mother Madina Sandhu     Alzheimer's disease Mother Madina Shahidchik     Heart disease Mother Madina Sandhu     Cancer Mother Madina Sandhu     Hypertension Mother Madina Sandhu     Emphysema Father Narendra Sandhu     Lung disease Father Narendra Sandhu     Heart disease Maternal Grandmother Yazmin Bird        Allergies   Allergen Reactions    Sulfa (Sulfonamide Antibiotics) Headache     severe headaches       Prior to Admission medications    Medication Sig Start Date End Date Taking? Authorizing Provider   allopurinol (Zyloprim) 100 mg tablet Take 2 tablets (200 mg) by mouth once daily. 10/11/24  Yes Historical Provider, MD   aspirin 81 mg chewable tablet Chew 1 tablet (81 mg) once daily. 10/13/11  Yes Historical Provider, MD   atorvastatin (Lipitor) 80 mg tablet TAKE 1 TABLET DAILY 1/13/25  Yes Nathan Gilmore DO   calcitriol (Rocaltrol) 0.25 mcg capsule Take 1 capsule (0.25 mcg) by mouth 3 (three) times a week. Mon, Wed, Fri 10/11/24  Yes Historical Provider, MD   doxazosin (Cardura) 1 mg tablet Take 1 tablet (1 mg) by mouth early in the morning.. 10/10/24  Yes Historical Provider, MD   metoprolol tartrate (Lopressor) 25 mg tablet Take 1 tablet (25 mg) by mouth 2 times a day. 7/29/24 7/29/25 Yes Nathan Gilmore DO   Synthroid 100 mcg tablet TAKE 1 TABLET ONCE DAILY ASDIRECTED 1/18/25  Yes Leandro Puckett MD   tamsulosin (Flomax) 0.4 mg 24 hr capsule Take 1 capsule (0.4 mg) by mouth once daily. 2/4/25 2/4/26 Yes Silviano Lindsey MD   vit C,O-Wu-xptwu-lutein-zeaxan (PreserVision AREDS-2) 250-90-40-1 mg tablet,chewable Chew 1 tablet 2 times a day.   Yes Historical Provider, MD   nitroglycerin (Nitrostat) 0.4 mg SL tablet Place 1 tablet (0.4 mg) under the tongue every 5 minutes if needed for chest pain.  Patient not taking: Reported on 3/3/2025 2/6/25   DO FLAKO Duckworth ROS     DASI Risk Score      Flowsheet Row Questionnaire Series Submission from 2/13/2025 in [facility] with  Generic Provider Mychart Questionnaire Series Submission from 11/8/2024 in [facility] with Generic Provider Mychart   Can you take care of yourself (eat, dress, bathe, or use toilet)?  2.75  filed at 02/13/2025 1019 2.75  filed at 11/08/2024 1646   Can you walk indoors, such as around your house? 1.75  filed at 02/13/2025 1019 1.75  filed at 11/08/2024 1646   Can you walk a block or two on level ground?  2.75  filed at 02/13/2025 1019 2.75  filed at 11/08/2024 1646   Can you climb a flight of stairs or walk up a hill? 5.5  filed at 02/13/2025 1019 5.5  filed at 11/08/2024 1646   Can you run a short distance? 8  filed at 02/13/2025 1019 8  filed at 11/08/2024 1646   Can you do light work around the house like dusting or washing dishes? 2.7  filed at 02/13/2025 1019 2.7  filed at 11/08/2024 1646   Can you do moderate work around the house like vacuuming, sweeping floors or carrying groceries? 3.5  filed at 02/13/2025 1019 3.5  filed at 11/08/2024 1646   Can you do heavy work around the house like scrubbing floors or lifting and moving heavy furniture?  8  filed at 02/13/2025 1019 0  filed at 11/08/2024 1646   Can you do yard work like raking leaves, weeding or pushing a mower? 4.5  filed at 02/13/2025 1019 4.5  filed at 11/08/2024 1646   Can you have sexual relations? 5.25  filed at 02/13/2025 1019 0  filed at 11/08/2024 1646   Can you participate in moderate recreational activities like golf, bowling, dancing, doubles tennis or throwing a baseball or football? 6  filed at 02/13/2025 1019 6  filed at 11/08/2024 1646   Can you participate in strenous sports like swimming, singles tennis, football, basketball, or skiing? 0  filed at 02/13/2025 1019 0  filed at 11/08/2024 1646   DASI SCORE 50.7  filed at 02/13/2025 1019 37.45  filed at 11/08/2024 1646   METS Score (Will be calculated only when all the questions are answered) 9  filed at 02/13/2025 1019 7.3  filed at 11/08/2024 1646          Caprini DVT Assessment    No  data to display       Modified Frailty Index    No data to display       [id number] Stroke Risk Points  Current as of just now        N/A 0 to 9 Points:      Last Change: N/A          The HHF3CK0-JEEe risk score (Lip SEB, et al. 2009. © 2010 American College [employer] Chest [employer]) quantifies the risk of stroke for a patient with atrial fibrillation. For patients without atrial fibrillation or under the age of 18 this score appears as N/A. Higher score values generally indicate higher risk of stroke.        This score is not applicable to this patient. Components are not calculated.          Revised Cardiac Risk Index    No data to display       Apfel Simplified Score    No data to display       Risk Analysis Index Results This Encounter    No data found in the last 10 encounters.       Stop Bang Score      Flowsheet Row Questionnaire Series Submission from 2/13/2025 in Virtual Care with Generic Provider Mychart Questionnaire Series Submission from 11/8/2024 in Virtual Care with Generic Provider Mychart   Do you snore loudly? 0  filed at 02/13/2025 1019 0  filed at 11/08/2024 1646   Do you often feel tired or fatigued after your sleep? 0  filed at 02/13/2025 1019 0  filed at 11/08/2024 1646   Has anyone ever observed you stop breathing in your sleep? 0  filed at 02/13/2025 1019 0  filed at 11/08/2024 1646   Do you have or are you being treated for high blood pressure? 1  filed at 02/13/2025 1019 1  filed at 11/08/2024 1646   Recent BMI (Calculated) 24.1  filed at 02/13/2025 1019 23.2  filed at 11/08/2024 1646   Is BMI greater than 35 kg/m2? 0=No  filed at 02/13/2025 1019 0=No  filed at 11/08/2024 1646   Age older than 50 years old? 1=Yes  filed at 02/13/2025 1019 1=Yes  filed at 11/08/2024 1646   Gender - Male 1=Yes  filed at 02/13/2025 1019 1=Yes  filed at 11/08/2024 1646          Prodigy: High Risk  Total Score: 20              Prodigy Age Score      Prodigy Gender Score          ARISCAT Score for Postoperative  Pulmonary Complications    No data to display       Carias Perioperative Risk for Myocardial Infarction or Cardiac Arrest (CHET)    No data to display         Nurse Plan of Action:   After Visit Summary (AVS) reviewed and patient verbalized good understanding of medications and NPO instructions.

## 2025-03-03 NOTE — PREPROCEDURE INSTRUCTIONS
Medication List            Accurate as of March 3, 2025  1:48 PM. Always use your most recent med list.                allopurinol 100 mg tablet  Commonly known as: Zyloprim  Medication Adjustments for Surgery: Take/Use as prescribed     aspirin 81 mg chewable tablet  Medication Adjustments for Surgery: Take/Use as prescribed     atorvastatin 80 mg tablet  Commonly known as: Lipitor  TAKE 1 TABLET DAILY  Medication Adjustments for Surgery: Take/Use as prescribed     calcitriol 0.25 mcg capsule  Commonly known as: Rocaltrol  Additional Medication Adjustments for Surgery: Take last dose 7 days before surgery     doxazosin 1 mg tablet  Commonly known as: Cardura  Medication Adjustments for Surgery: Take/Use as prescribed     metoprolol tartrate 25 mg tablet  Commonly known as: Lopressor  Take 1 tablet (25 mg) by mouth 2 times a day.  Medication Adjustments for Surgery: Take/Use as prescribed     nitroglycerin 0.4 mg SL tablet  Commonly known as: Nitrostat  Place 1 tablet (0.4 mg) under the tongue every 5 minutes if needed for chest pain.  Medication Adjustments for Surgery: Take/Use as prescribed     PreserVision AREDS-2 250-90-40-1 mg tablet,chewable  Generic drug: vit C,H-Go-mnewi-lutein-zeaxan  Additional Medication Adjustments for Surgery: Take last dose 7 days before surgery     Synthroid 100 mcg tablet  Generic drug: levothyroxine  TAKE 1 TABLET ONCE DAILY ASDIRECTED  Medication Adjustments for Surgery: Take/Use as prescribed     tamsulosin 0.4 mg 24 hr capsule  Commonly known as: Flomax  Take 1 capsule (0.4 mg) by mouth once daily.  Medication Adjustments for Surgery: Take/Use as prescribed                Preoperative Brain Exercises    What are brain exercises?  A brain exercise is any activity that engages your thinking (cognitive) skills.    What types of activities are considered brain exercises?  Jigsaw puzzles, crossword puzzles, word jumble, memory games, word search, and many more.  Many can be found  free online or on your phone via a mobile baldemar.    Why should I do brain exercises before my surgery?  More recent research has shown brain exercise before surgery can lower the risk of postoperative delirium (confusion) which can be especially important for older adults.  Patients who did brain exercises for 5 to 10 hours the days before surgery, cut their risk of postoperative delirium in half up to 1 week after surgery.        Preoperative Deep Breathing Exercises  Why it is important to do deep breathing exercises before my surgery?  Deep breathing exercises strengthen your breathing muscles.  This helps you to recover after your surgery and decreases the chance of breathing complications.  How are the deep breathing exercises done?  Sit straight with your back supported.  Breathe in deeply and slowly through your nose. Your lower rib cage should expand and your abdomen may move forward.  Hold that breath for 3 to 5 seconds.  Breathe out through pursed lips, slowly and completely.  Rest and repeat 10 times every hour while awake.  Rest longer if you become dizzy or lightheaded.        CONTACT SURGEON'S OFFICE IF YOU DEVELOP:  * Fever = 100.4 F   * New respiratory symptoms (e.g. cough, shortness of breath, respiratory distress, sore throat)  * Recent loss of taste or smell  *Flu like symptoms such as headache, fatigue or gastrointestinal symptoms  * You develop any open sores, shingles, burning or painful urination   AND/OR:  * You no longer wish to have the surgery.  * Any other personal circumstances change that may lead to the need to cancel or defer this surgery.  *You were admitted to any hospital within one week of your planned procedure.    SMOKING:  *Quitting smoking can make a huge difference to your health and recovery from surgery.    *If you need help with quitting, call 4-800-QUIT-NOW.    THE DAY OF SURGERY:  *Do not eat any food after midnight the night before your surgery.   *YOU MUST drink 14  OUNCES of clear liquids TWO hours before your instructed ARRIVAL TIME to the hospital. This includes water, black tea/coffee (no milk or cream), apple juice, clear broth and electrolyte drinks (Gatorade).  Please avoid clear liquids that are red in color.   *You may chew gum/mints up to TWO hours before your surgery/procedure.    SURGICAL TIME:  *You will be contacted between 2 p.m. and 6 p.m. the business day before your surgery with your arrival time.  *If you haven't received a call by 6pm, call [phone].  *Scheduled surgery times may change and you will be notified if this occurs-check your personal voicemail for any updates.    ON THE MORNING OF SURGERY:  *Wear comfortable, loose fitting clothing.   *Do not use moisturizers, creams, lotions or perfume.  *All jewelry and valuables should be left at home.  *Prosthetic devices such as contact lenses, hearing aids, dentures, eyelash extensions, hairpins and body piercing must be removed before surgery.    BRING WITH YOU:  *Photo ID and insurance card  *Current list of medications and allergies  *Pacemaker/Defibrillator/Heart stent cards  *CPAP machine and mask  *Slings/splints/crutches  *Copy of your complete Advanced Directive/DHPOA-if applicable  *Neurostimulator implant remote    PARKING AND ARRIVAL:  *Check in at the Main Entrance desk and let them know you are here for surgery.  *You will be directed to the 2nd floor surgical waiting area.    IF YOU ARE HAVING OUTPATIENT/SAME DAY SURGERY:  *A responsible adult MUST accompany you at the time of discharge and stay with you for 24 hours after your surgery.  *You may NOT drive yourself home after surgery.  *You may use a taxi or ride sharing service ([employer], [employer]) to return home ONLY if you are accompanied by a friend or family member.  *Instructions for resuming your medications will be provided by your surgeon.

## 2025-03-03 NOTE — H&P (VIEW-ONLY)
"[facility]/PAT Evaluation       Name: Trent Sandhu (Trent Sandhu)  /Age: 1955/70 y.o.     In-Person         Date of Consult: 3/3/25    Referring Provider:  Dr. Carpenter    Date, Surgery, and Length:  3/11/25, anatomical endoscopic prostate enucleation- HOLEP, 120 minutes    Patient presents to [facility] for perioperative risk assessment prior to scheduled surgery. Patient presents with urinary retention requiring golden catheter. He will proceed with surgical management.       This note was created in part upon personal review of patient's medical records.        Pt denies any past history of anesthetic complications such as PONV, awareness, prolonged sedation, dental damage, aspiration, cardiac arrest, difficult intubation, difficult I.V. access or unexpected hospital admissions. No history of malignant hyperthermia and or pseudocholinesterase deficiency.    No history of blood transfusions.    The patient IS NOT a Spiritism and will accept blood and blood products if medically indicated.     Type and screen NOT sent.    Past Medical History:   Diagnosis Date    ASHD (arteriosclerotic heart disease)     drug-eluting stent placed into the circumflex artery back in May 2011.    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 Gilmore \"Stable from a cardiac standpoint for TURP 2024       No CP/anginal symptoms. No episodes of heart failure. Good functional capacity. Normal twelve-lead EKG. He may proceed with the prostate surgery at acceptable low cardiovascular risk.  He will likely need to stop his aspirin 1 week prior to the procedure & then restart postoperatively " "when it is felt bleeding this is acceptable.\"    H/O echocardiogram 09/11/2024    CONCLUSIONS:   1. Left ventricular ejection fraction is normal, by visual estimate at 55-60%.   2. Spectral Doppler shows an impaired relaxation pattern of left ventricular diastolic filling.   3. There is normal right ventricular global systolic function.   4. Right ventricular within normal limits.    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       Past Surgical History:   Procedure Laterality Date    CORONARY ANGIOPLASTY  2011    EYE SURGERY Left 12/17/1997    HERNIA REPAIR  2022    TRANSURETHRAL RESECTION OF PROSTATE  12/20/2024       Family History   Problem Relation Name Age of Onset    Heart attack Mother Madina Sawchiky     Alzheimer's disease Mother Madina Sawchik     Heart disease Mother Madina Sawchik     Cancer Mother Madina Shahidchiky     Hypertension Mother Madina Shahidchiky     Emphysema Father Narendra Sawchik     Lung disease Father Narendra Sawchik     Heart disease Maternal Grandmother Yazmin Valeriear        Allergies   Allergen Reactions    Sulfa (Sulfonamide Antibiotics) Headache     severe headaches     Social History     Tobacco Use   Smoking Status Never   Smokeless Tobacco Never     Social History     Substance and Sexual Activity   Alcohol Use Not Currently     Social History     Substance and Sexual Activity   Drug Use Never     Current Outpatient Medications   Medication Instructions    allopurinol (ZYLOPRIM) 200 mg, Daily    aspirin 81 mg chewable tablet 1 tablet, Daily    atorvastatin (LIPITOR) 80 mg, oral, Daily    calcitriol (Rocaltrol) 0.25 mcg capsule Take 1 capsule (0.25 mcg) by mouth 3 (three) times a week. Mon, Wed, Fri    doxazosin (Cardura) 1 mg tablet 1 tablet, Daily (0630)    metoprolol " "tartrate (LOPRESSOR) 25 mg, oral, 2 times daily    nitroglycerin (NITROSTAT) 0.4 mg, sublingual, Every 5 min PRN    Synthroid 100 mcg, oral, Daily    tamsulosin (FLOMAX) 0.4 mg, oral, Daily    vit C,C-Ps-feqsz-lutein-zeaxan (PreserVision AREDS-2) 250-90-40-1 mg tablet,chewable 1 tablet, 2 times daily       PAT ROS:   Constitutional:   neg    Neuro/Psych:   neg    Eyes:    Prosthetic left eye    use of corrective lenses  Ears:   neg    Nose:   neg    Mouth:   neg    Throat:   neg    Neck:   neg    Cardio:   neg    Respiratory:   neg    Endocrine:   GI:   neg    :    Cox catheter in place   neg    Musculoskeletal:   neg    Hematologic:   neg    Skin:  neg        Physical Exam  Vitals reviewed. Physical exam within normal limits.          PAT AIRWAY:   Airway:     Mallampati::  II    Neck ROM::  Full  normal        Visit Vitals  /65   Pulse 67   Temp 36.7 °C (98.1 °F) (Tympanic)   Resp 12   Ht 1.65 m (5' 4.96\")   Wt 73.6 kg (162 lb 4.1 oz)   SpO2 100%   BMI 27.03 kg/m²   Smoking Status Never   BSA 1.84 m²       Assessment and Plan:     Patient is a 70 year old male scheduled for anatomical endoscopic prostate enucleation -HOLEP with Dr. Carpetner on 3/11/25.     Patient is at acceptable risk to proceed with planned surgical procedure. Further cardiac risk stratification deferred at this time.This patient is INTERMEDIATE risk candidate undergoing MODERATE risk procedure, patient is medically optimized for surgery.    Plan    Neuro:  No neurologic diagnosis, however, the patient is at increased risk for perioperative delirium secondary to  age, polypharmacy, renal insufficiency, type and duration of surgery, Patient instructed on and provided cognitive exercises  Patient is at increased risk for perioperative CVA secondary to  HTN, increased age      Cardiovascular:    RCRI: 0 Risk of Mace: 3.9%    Patient denies any chest pain, tightness, heaviness, pressure, radiating pain, palpitations, irregular heartbeats, " lightheadedness, cough, congestion, shortness of breath, JOHNSON, PND, near syncope, weight loss or gain.    Good functional capacity  Functional 4 Mets. Patient denies SOB walking up 2 flights of stairs        EKG in PAT not obtained. Reviewed last EKG  Encounter Date: 11/07/24   ECG 12 lead (Clinic Performed)    Narrative    Normal sinus rhythm  Normal EKG     CAD- PCI x1 in 2011. Follows with Dr. Carias. Will continue Metoprolol, Aspirin 81 mg, and atorvastatin      Pulmonary:  No pulmonary diagnosis, however patient is at increased risk of perioperative complications secondary to  age > 60, site of surgery, major surgery, duration of surgery > 2 hours, types of anesthetic  Stop Bang score is 4 placing patient at moderate risk for ANTHONY  ARISCAT: >45 points, 42.1% risk of in-hospital postoperative pulmonary complication  PRODIGY: High risk for opioid induced respiratory depression      Renal/endo:  Recommendations to avoid nephrotoxic drugs and carefully monitor fluid status to maintain euvolemia. Use dose adjusted medications as needed for the underlying level of renal function.    Hypothyroidism- will continue Levothryoxine      GI/:    Pending HOLEP      Heme:  Patient instructed to ambulate as soon as possible postoperatively to decrease thromboembolic risk.    Initiate mechanical DVT prophylaxis as soon as possible and initiate chemical prophylaxis when deemed safe from a bleeding standpoint post surgery.      Caprini= 4      Risk assessment complete.  Patient is scheduled for a INTERMEDIATE surgical risk procedure.   He IS considered medically optimized for the planned procedure.        Labs/testing obtained in PAT on 3/3/25: CBC, BMP, UA   Lab Results   Component Value Date    WBC 5.8 03/03/2025    HGB 13.8 03/03/2025    HCT 39.2 (L) 03/03/2025    MCV 92 03/03/2025     (L) 03/03/2025     Lab Results   Component Value Date    GLUCOSE 100 (H) 03/03/2025    CALCIUM 9.2 03/03/2025     03/03/2025    K  4.2 03/03/2025    CO2 32 03/03/2025     03/03/2025    BUN 25 (H) 03/03/2025    CREATININE 1.59 (H) 03/03/2025       Follow up/communication: none      Preoperative medication instructions were provided and reviewed with the patient.  Any additional testing or evaluation was explained to the patient.  Nothing by mouth instructions were discussed and patient's questions were answered prior to conclusion to this encounter.  Patient verbalized understanding of preoperative instructions given in preadmission testing; discharge instructions available in EMR.    This note was dictated with speech recognition.  Minor errors may have been detected during use of speech recognition.

## 2025-03-03 NOTE — CPM/PAT H&P
"[facility]/PAT Evaluation       Name: Trent Sandhu (Trent Sandhu)  /Age: 1955/70 y.o.     In-Person         Date of Consult: 3/3/25    Referring Provider:  Dr. Carpenter    Date, Surgery, and Length:  3/11/25, anatomical endoscopic prostate enucleation- HOLEP, 120 minutes    Patient presents to [facility] for perioperative risk assessment prior to scheduled surgery. Patient presents with urinary retention requiring golden catheter. He will proceed with surgical management.       This note was created in part upon personal review of patient's medical records.        Pt denies any past history of anesthetic complications such as PONV, awareness, prolonged sedation, dental damage, aspiration, cardiac arrest, difficult intubation, difficult I.V. access or unexpected hospital admissions. No history of malignant hyperthermia and or pseudocholinesterase deficiency.    No history of blood transfusions.    The patient IS NOT a Judaism and will accept blood and blood products if medically indicated.     Type and screen NOT sent.    Past Medical History:   Diagnosis Date    ASHD (arteriosclerotic heart disease)     drug-eluting stent placed into the circumflex artery back in May 2011.    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 Gilmore \"Stable from a cardiac standpoint for TURP 2024       No CP/anginal symptoms. No episodes of heart failure. Good functional capacity. Normal twelve-lead EKG. He may proceed with the prostate surgery at acceptable low cardiovascular risk.  He will likely need to stop his aspirin 1 week prior to the procedure & then restart postoperatively " "when it is felt bleeding this is acceptable.\"    H/O echocardiogram 09/11/2024    CONCLUSIONS:   1. Left ventricular ejection fraction is normal, by visual estimate at 55-60%.   2. Spectral Doppler shows an impaired relaxation pattern of left ventricular diastolic filling.   3. There is normal right ventricular global systolic function.   4. Right ventricular within normal limits.    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       Past Surgical History:   Procedure Laterality Date    CORONARY ANGIOPLASTY  2011    EYE SURGERY Left 12/17/1997    HERNIA REPAIR  2022    TRANSURETHRAL RESECTION OF PROSTATE  12/20/2024       Family History   Problem Relation Name Age of Onset    Heart attack Mother Madina Sawchiky     Alzheimer's disease Mother Madina Sawchik     Heart disease Mother Madina Sawchik     Cancer Mother Madina Shahidchiky     Hypertension Mother Madina Shahidchiky     Emphysema Father Narendra Sawchik     Lung disease Father Narendra Sawchik     Heart disease Maternal Grandmother Yazmin Valeriear        Allergies   Allergen Reactions    Sulfa (Sulfonamide Antibiotics) Headache     severe headaches     Social History     Tobacco Use   Smoking Status Never   Smokeless Tobacco Never     Social History     Substance and Sexual Activity   Alcohol Use Not Currently     Social History     Substance and Sexual Activity   Drug Use Never     Current Outpatient Medications   Medication Instructions    allopurinol (ZYLOPRIM) 200 mg, Daily    aspirin 81 mg chewable tablet 1 tablet, Daily    atorvastatin (LIPITOR) 80 mg, oral, Daily    calcitriol (Rocaltrol) 0.25 mcg capsule Take 1 capsule (0.25 mcg) by mouth 3 (three) times a week. Mon, Wed, Fri    doxazosin (Cardura) 1 mg tablet 1 tablet, Daily (0630)    metoprolol " "tartrate (LOPRESSOR) 25 mg, oral, 2 times daily    nitroglycerin (NITROSTAT) 0.4 mg, sublingual, Every 5 min PRN    Synthroid 100 mcg, oral, Daily    tamsulosin (FLOMAX) 0.4 mg, oral, Daily    vit C,U-Qn-tmoow-lutein-zeaxan (PreserVision AREDS-2) 250-90-40-1 mg tablet,chewable 1 tablet, 2 times daily       PAT ROS:   Constitutional:   neg    Neuro/Psych:   neg    Eyes:    Prosthetic left eye    use of corrective lenses  Ears:   neg    Nose:   neg    Mouth:   neg    Throat:   neg    Neck:   neg    Cardio:   neg    Respiratory:   neg    Endocrine:   GI:   neg    :    Cox catheter in place   neg    Musculoskeletal:   neg    Hematologic:   neg    Skin:  neg        Physical Exam  Vitals reviewed. Physical exam within normal limits.          PAT AIRWAY:   Airway:     Mallampati::  II    Neck ROM::  Full  normal        Visit Vitals  /65   Pulse 67   Temp 36.7 °C (98.1 °F) (Tympanic)   Resp 12   Ht 1.65 m (5' 4.96\")   Wt 73.6 kg (162 lb 4.1 oz)   SpO2 100%   BMI 27.03 kg/m²   Smoking Status Never   BSA 1.84 m²       Assessment and Plan:     Patient is a 70 year old male scheduled for anatomical endoscopic prostate enucleation -HOLEP with Dr. Carpenter on 3/11/25.     Patient is at acceptable risk to proceed with planned surgical procedure. Further cardiac risk stratification deferred at this time.This patient is INTERMEDIATE risk candidate undergoing MODERATE risk procedure, patient is medically optimized for surgery.    Plan    Neuro:  No neurologic diagnosis, however, the patient is at increased risk for perioperative delirium secondary to  age, polypharmacy, renal insufficiency, type and duration of surgery, Patient instructed on and provided cognitive exercises  Patient is at increased risk for perioperative CVA secondary to  HTN, increased age      Cardiovascular:    RCRI: 0 Risk of Mace: 3.9%    Patient denies any chest pain, tightness, heaviness, pressure, radiating pain, palpitations, irregular heartbeats, " lightheadedness, cough, congestion, shortness of breath, JOHNSON, PND, near syncope, weight loss or gain.    Good functional capacity  Functional 4 Mets. Patient denies SOB walking up 2 flights of stairs        EKG in PAT not obtained. Reviewed last EKG  Encounter Date: 11/07/24   ECG 12 lead (Clinic Performed)    Narrative    Normal sinus rhythm  Normal EKG     CAD- PCI x1 in 2011. Follows with Dr. Carias. Will continue Metoprolol, Aspirin 81 mg, and atorvastatin      Pulmonary:  No pulmonary diagnosis, however patient is at increased risk of perioperative complications secondary to  age > 60, site of surgery, major surgery, duration of surgery > 2 hours, types of anesthetic  Stop Bang score is 4 placing patient at moderate risk for ANTHONY  ARISCAT: >45 points, 42.1% risk of in-hospital postoperative pulmonary complication  PRODIGY: High risk for opioid induced respiratory depression      Renal/endo:  Recommendations to avoid nephrotoxic drugs and carefully monitor fluid status to maintain euvolemia. Use dose adjusted medications as needed for the underlying level of renal function.    Hypothyroidism- will continue Levothryoxine      GI/:    Pending HOLEP      Heme:  Patient instructed to ambulate as soon as possible postoperatively to decrease thromboembolic risk.    Initiate mechanical DVT prophylaxis as soon as possible and initiate chemical prophylaxis when deemed safe from a bleeding standpoint post surgery.      Caprini= 4      Risk assessment complete.  Patient is scheduled for a INTERMEDIATE surgical risk procedure.   He IS considered medically optimized for the planned procedure.        Labs/testing obtained in PAT on 3/3/25: CBC, BMP, UA   Lab Results   Component Value Date    WBC 5.8 03/03/2025    HGB 13.8 03/03/2025    HCT 39.2 (L) 03/03/2025    MCV 92 03/03/2025     (L) 03/03/2025     Lab Results   Component Value Date    GLUCOSE 100 (H) 03/03/2025    CALCIUM 9.2 03/03/2025     03/03/2025    K  4.2 03/03/2025    CO2 32 03/03/2025     03/03/2025    BUN 25 (H) 03/03/2025    CREATININE 1.59 (H) 03/03/2025       Follow up/communication: none      Preoperative medication instructions were provided and reviewed with the patient.  Any additional testing or evaluation was explained to the patient.  Nothing by mouth instructions were discussed and patient's questions were answered prior to conclusion to this encounter.  Patient verbalized understanding of preoperative instructions given in preadmission testing; discharge instructions available in EMR.    This note was dictated with speech recognition.  Minor errors may have been detected during use of speech recognition.

## 2025-03-04 LAB — HOLD SPECIMEN: NORMAL

## 2025-03-04 NOTE — PROGRESS NOTES
HPI    70 y.o. male being seen with the following problem list:    Problem list:  Urinary retention     Pt with h/o urinary retention.  Was in ER back in Oct/24, golden placed for 1400cc.  Had seen Dr Arguello in the past. Was referred to Dr Titus.  CT scan showed enlarged prostate (approx 90g), thickened bladder wall, mild hydro.  Had TURP with Dr Titus on 12/20/24 (11g benign tissue).  Has failed TOV after x2.  Hasn't  been able to self cath.       02/12/25 - Here for a cysto to evaluate his channel. Cysto shows modest two part channel near the bladder neck, obstructing tissue.     03/05/25 - Here today for cath change. Surgery on 3/11/25      Current Medications:  Current Outpatient Medications   Medication Sig Dispense Refill    allopurinol (Zyloprim) 100 mg tablet Take 2 tablets (200 mg) by mouth once daily.      aspirin 81 mg chewable tablet Chew 1 tablet (81 mg) once daily.      atorvastatin (Lipitor) 80 mg tablet TAKE 1 TABLET DAILY 90 tablet 3    calcitriol (Rocaltrol) 0.25 mcg capsule Take 1 capsule (0.25 mcg) by mouth 3 (three) times a week. Mon, Wed, Fri      doxazosin (Cardura) 1 mg tablet Take 1 tablet (1 mg) by mouth early in the morning..      metoprolol tartrate (Lopressor) 25 mg tablet Take 1 tablet (25 mg) by mouth 2 times a day. 180 tablet 3    nitroglycerin (Nitrostat) 0.4 mg SL tablet Place 1 tablet (0.4 mg) under the tongue every 5 minutes if needed for chest pain. (Patient not taking: Reported on 3/3/2025) 25 tablet 3    Synthroid 100 mcg tablet TAKE 1 TABLET ONCE DAILY ASDIRECTED 90 tablet 0    tamsulosin (Flomax) 0.4 mg 24 hr capsule Take 1 capsule (0.4 mg) by mouth once daily. 90 capsule 3    vit C,H-Ci-nvsqe-lutein-zeaxan (PreserVision AREDS-2) 250-90-40-1 mg tablet,chewable Chew 1 tablet 2 times a day.       No current facility-administered medications for this visit.        Active Problems:  Trent Sandhu is a 70 y.o. male with the following Problems and Medications.  Patient Active  Problem List   Diagnosis    ASHD (arteriosclerotic heart disease)    HTN (hypertension)    Hyperlipemia    Preop cardiovascular exam    Retention of urine    Benign prostatic hyperplasia with urinary retention    Obstructive uropathy    Cox catheter in place     Current Outpatient Medications   Medication Sig Dispense Refill    allopurinol (Zyloprim) 100 mg tablet Take 2 tablets (200 mg) by mouth once daily.      aspirin 81 mg chewable tablet Chew 1 tablet (81 mg) once daily.      atorvastatin (Lipitor) 80 mg tablet TAKE 1 TABLET DAILY 90 tablet 3    calcitriol (Rocaltrol) 0.25 mcg capsule Take 1 capsule (0.25 mcg) by mouth 3 (three) times a week. Mon, Wed, Fri      doxazosin (Cardura) 1 mg tablet Take 1 tablet (1 mg) by mouth early in the morning..      metoprolol tartrate (Lopressor) 25 mg tablet Take 1 tablet (25 mg) by mouth 2 times a day. 180 tablet 3    nitroglycerin (Nitrostat) 0.4 mg SL tablet Place 1 tablet (0.4 mg) under the tongue every 5 minutes if needed for chest pain. (Patient not taking: Reported on 3/3/2025) 25 tablet 3    Synthroid 100 mcg tablet TAKE 1 TABLET ONCE DAILY ASDIRECTED 90 tablet 0    tamsulosin (Flomax) 0.4 mg 24 hr capsule Take 1 capsule (0.4 mg) by mouth once daily. 90 capsule 3    vit C,Z-Rn-iygex-lutein-zeaxan (PreserVision AREDS-2) 250-90-40-1 mg tablet,chewable Chew 1 tablet 2 times a day.       No current facility-administered medications for this visit.       PMH:  Past Medical History:   Diagnosis Date    ASHD (arteriosclerotic heart disease)     drug-eluting stent placed into the circumflex artery back in May 2011.    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 Gilmore \"Stable from a cardiac standpoint for TURP 12/2024       No CP/anginal symptoms. No episodes of heart failure. Good functional capacity. Normal twelve-lead EKG. He may proceed with the prostate surgery at acceptable low cardiovascular risk.  He will likely need to stop his aspirin 1 week prior to the procedure & then restart postoperatively when it is felt bleeding this is acceptable.\"    H/O echocardiogram 09/11/2024    CONCLUSIONS:   1. Left ventricular ejection fraction is normal, by visual estimate at 55-60%.   2. Spectral Doppler shows an impaired relaxation pattern of left ventricular diastolic filling.   3. There is normal right ventricular global systolic function.   4. Right ventricular within normal limits.    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       PSH:  Past Surgical History:   Procedure Laterality Date    CORONARY ANGIOPLASTY  2011    EYE SURGERY Left 12/17/1997    HERNIA REPAIR  2022    TRANSURETHRAL RESECTION OF PROSTATE  12/20/2024       FMH:  Family History   Problem Relation Name Age of Onset    Heart attack Mother Madina Sandhu     Alzheimer's disease Mother Madina Sawchik     Heart disease Mother Madina Kleberchik     Cancer Mother Madina Kleberchiky     Hypertension Mother Madina Shahidchiky     Emphysema Father Narendra Sawchik     Lung disease Father Narendra Sawchik     Heart disease Maternal Grandmother Yazmin Bird        SHx:  Social History     Tobacco Use    Smoking status: Never    Smokeless tobacco: Never   Vaping Use    Vaping status: Never Used   Substance Use Topics    Alcohol use: Not Currently    Drug use: Never       Allergies:  Allergies   Allergen Reactions    Sulfa (Sulfonamide Antibiotics) Headache     severe " headaches         Assessment/Plan  Cath exchanged successfully, follow up next week for surgery as scheduled.        Scribe Attestation  By signing my name below, I, Anthony Montgomery, attest that this documentation has been prepared under the direction and in the presence of Collin Carpenter MD.

## 2025-03-05 ENCOUNTER — OFFICE VISIT (OUTPATIENT)
Dept: UROLOGY | Facility: HOSPITAL | Age: 70
End: 2025-03-05
Payer: MEDICARE

## 2025-03-05 DIAGNOSIS — N40.1 BENIGN PROSTATIC HYPERPLASIA WITH URINARY RETENTION: Primary | ICD-10-CM

## 2025-03-05 DIAGNOSIS — R33.8 BENIGN PROSTATIC HYPERPLASIA WITH URINARY RETENTION: Primary | ICD-10-CM

## 2025-03-05 LAB — BACTERIA UR CULT: NORMAL

## 2025-03-05 PROCEDURE — 51702 INSERT TEMP BLADDER CATH: CPT | Performed by: UROLOGY

## 2025-03-05 PROCEDURE — 99213 OFFICE O/P EST LOW 20 MIN: CPT | Performed by: UROLOGY

## 2025-03-05 PROCEDURE — 1159F MED LIST DOCD IN RCRD: CPT | Performed by: UROLOGY

## 2025-03-05 PROCEDURE — 1157F ADVNC CARE PLAN IN RCRD: CPT | Performed by: UROLOGY

## 2025-03-05 PROCEDURE — 1036F TOBACCO NON-USER: CPT | Performed by: UROLOGY

## 2025-03-11 ENCOUNTER — ANESTHESIA (OUTPATIENT)
Dept: OPERATING ROOM | Facility: HOSPITAL | Age: 70
End: 2025-03-11
Payer: MEDICARE

## 2025-03-11 ENCOUNTER — ANESTHESIA EVENT (OUTPATIENT)
Dept: OPERATING ROOM | Facility: HOSPITAL | Age: 70
End: 2025-03-11
Payer: MEDICARE

## 2025-03-11 ENCOUNTER — HOSPITAL ENCOUNTER (OUTPATIENT)
Facility: HOSPITAL | Age: 70
LOS: 1 days | Discharge: HOME | End: 2025-03-12
Attending: UROLOGY | Admitting: UROLOGY
Payer: MEDICARE

## 2025-03-11 DIAGNOSIS — Z97.8 FOLEY CATHETER IN PLACE: Primary | ICD-10-CM

## 2025-03-11 DIAGNOSIS — N13.9 OBSTRUCTIVE UROPATHY: ICD-10-CM

## 2025-03-11 DIAGNOSIS — G89.18 ACUTE POST-OPERATIVE PAIN: ICD-10-CM

## 2025-03-11 DIAGNOSIS — N40.1 BENIGN PROSTATIC HYPERPLASIA WITH URINARY RETENTION: ICD-10-CM

## 2025-03-11 DIAGNOSIS — R33.8 BENIGN PROSTATIC HYPERPLASIA WITH URINARY RETENTION: ICD-10-CM

## 2025-03-11 PROBLEM — N13.8 BPH WITH OBSTRUCTION/LOWER URINARY TRACT SYMPTOMS: Status: ACTIVE | Noted: 2025-03-11

## 2025-03-11 PROCEDURE — 2500000004 HC RX 250 GENERAL PHARMACY W/ HCPCS (ALT 636 FOR OP/ED)

## 2025-03-11 PROCEDURE — 2500000001 HC RX 250 WO HCPCS SELF ADMINISTERED DRUGS (ALT 637 FOR MEDICARE OP): Performed by: STUDENT IN AN ORGANIZED HEALTH CARE EDUCATION/TRAINING PROGRAM

## 2025-03-11 PROCEDURE — 2500000004 HC RX 250 GENERAL PHARMACY W/ HCPCS (ALT 636 FOR OP/ED): Performed by: NURSE ANESTHETIST, CERTIFIED REGISTERED

## 2025-03-11 PROCEDURE — 52649 PROSTATE LASER ENUCLEATION: CPT | Performed by: UROLOGY

## 2025-03-11 PROCEDURE — 2500000005 HC RX 250 GENERAL PHARMACY W/O HCPCS

## 2025-03-11 PROCEDURE — C1889 IMPLANT/INSERT DEVICE, NOC: HCPCS | Performed by: UROLOGY

## 2025-03-11 PROCEDURE — C1758 CATHETER, URETERAL: HCPCS | Performed by: UROLOGY

## 2025-03-11 PROCEDURE — A52649 PR LASER ENUCLEATION PROSTATE W MORCELLATION: Performed by: NURSE ANESTHETIST, CERTIFIED REGISTERED

## 2025-03-11 PROCEDURE — 2720000007 HC OR 272 NO HCPCS: Performed by: UROLOGY

## 2025-03-11 PROCEDURE — 7100000011 HC EXTENDED STAY RECOVERY HOURLY - NURSING UNIT

## 2025-03-11 PROCEDURE — 3600000004 HC OR TIME - INITIAL BASE CHARGE - PROCEDURE LEVEL FOUR: Performed by: UROLOGY

## 2025-03-11 PROCEDURE — 2500000001 HC RX 250 WO HCPCS SELF ADMINISTERED DRUGS (ALT 637 FOR MEDICARE OP): Performed by: ANESTHESIOLOGY

## 2025-03-11 PROCEDURE — 7100000001 HC RECOVERY ROOM TIME - INITIAL BASE CHARGE: Performed by: UROLOGY

## 2025-03-11 PROCEDURE — 3600000009 HC OR TIME - EACH INCREMENTAL 1 MINUTE - PROCEDURE LEVEL FOUR: Performed by: UROLOGY

## 2025-03-11 PROCEDURE — 3700000002 HC GENERAL ANESTHESIA TIME - EACH INCREMENTAL 1 MINUTE: Performed by: UROLOGY

## 2025-03-11 PROCEDURE — 9420000001 HC RT PATIENT EDUCATION 5 MIN

## 2025-03-11 PROCEDURE — 3700000001 HC GENERAL ANESTHESIA TIME - INITIAL BASE CHARGE: Performed by: UROLOGY

## 2025-03-11 PROCEDURE — 7100000002 HC RECOVERY ROOM TIME - EACH INCREMENTAL 1 MINUTE: Performed by: UROLOGY

## 2025-03-11 RX ORDER — PROPOFOL 10 MG/ML
INJECTION, EMULSION INTRAVENOUS AS NEEDED
Status: DISCONTINUED | OUTPATIENT
Start: 2025-03-11 | End: 2025-03-11

## 2025-03-11 RX ORDER — LIDOCAINE HYDROCHLORIDE 10 MG/ML
0.1 INJECTION, SOLUTION EPIDURAL; INFILTRATION; INTRACAUDAL; PERINEURAL ONCE
Status: DISCONTINUED | OUTPATIENT
Start: 2025-03-11 | End: 2025-03-11 | Stop reason: HOSPADM

## 2025-03-11 RX ORDER — ONDANSETRON HYDROCHLORIDE 2 MG/ML
INJECTION, SOLUTION INTRAVENOUS AS NEEDED
Status: DISCONTINUED | OUTPATIENT
Start: 2025-03-11 | End: 2025-03-11

## 2025-03-11 RX ORDER — CEFAZOLIN 1 G/1
INJECTION, POWDER, FOR SOLUTION INTRAVENOUS AS NEEDED
Status: DISCONTINUED | OUTPATIENT
Start: 2025-03-11 | End: 2025-03-11

## 2025-03-11 RX ORDER — AMOXICILLIN 500 MG/1
500 CAPSULE ORAL EVERY 8 HOURS SCHEDULED
Status: DISCONTINUED | OUTPATIENT
Start: 2025-03-11 | End: 2025-03-12 | Stop reason: HOSPADM

## 2025-03-11 RX ORDER — FENTANYL CITRATE 50 UG/ML
INJECTION, SOLUTION INTRAMUSCULAR; INTRAVENOUS AS NEEDED
Status: DISCONTINUED | OUTPATIENT
Start: 2025-03-11 | End: 2025-03-11

## 2025-03-11 RX ORDER — ACETAMINOPHEN 325 MG/1
650 TABLET ORAL EVERY 6 HOURS PRN
Qty: 30 TABLET | Refills: 0 | Status: SHIPPED | OUTPATIENT
Start: 2025-03-11

## 2025-03-11 RX ORDER — LEVOTHYROXINE SODIUM 100 UG/1
100 TABLET ORAL DAILY
Status: DISCONTINUED | OUTPATIENT
Start: 2025-03-12 | End: 2025-03-12 | Stop reason: HOSPADM

## 2025-03-11 RX ORDER — OXYCODONE HYDROCHLORIDE 5 MG/1
5 TABLET ORAL EVERY 4 HOURS PRN
Status: DISCONTINUED | OUTPATIENT
Start: 2025-03-11 | End: 2025-03-11 | Stop reason: HOSPADM

## 2025-03-11 RX ORDER — POLYETHYLENE GLYCOL 3350 17 G/17G
17 POWDER, FOR SOLUTION ORAL DAILY PRN
Status: DISCONTINUED | OUTPATIENT
Start: 2025-03-11 | End: 2025-03-12 | Stop reason: HOSPADM

## 2025-03-11 RX ORDER — ONDANSETRON HYDROCHLORIDE 2 MG/ML
4 INJECTION, SOLUTION INTRAVENOUS ONCE AS NEEDED
Status: DISCONTINUED | OUTPATIENT
Start: 2025-03-11 | End: 2025-03-11 | Stop reason: HOSPADM

## 2025-03-11 RX ORDER — CALCITRIOL 0.25 UG/1
0.25 CAPSULE ORAL 3 TIMES WEEKLY
Status: DISCONTINUED | OUTPATIENT
Start: 2025-03-12 | End: 2025-03-12 | Stop reason: HOSPADM

## 2025-03-11 RX ORDER — CEFAZOLIN SODIUM 2 G/100ML
2 INJECTION, SOLUTION INTRAVENOUS ONCE
Status: DISCONTINUED | OUTPATIENT
Start: 2025-03-11 | End: 2025-03-11 | Stop reason: HOSPADM

## 2025-03-11 RX ORDER — GENTAMICIN SULFATE 60 MG/50ML
120 INJECTION, SOLUTION INTRAVENOUS ONCE
Status: DISCONTINUED | OUTPATIENT
Start: 2025-03-11 | End: 2025-03-11 | Stop reason: HOSPADM

## 2025-03-11 RX ORDER — METOCLOPRAMIDE HYDROCHLORIDE 5 MG/ML
10 INJECTION INTRAMUSCULAR; INTRAVENOUS ONCE AS NEEDED
Status: DISCONTINUED | OUTPATIENT
Start: 2025-03-11 | End: 2025-03-11 | Stop reason: HOSPADM

## 2025-03-11 RX ORDER — ALLOPURINOL 100 MG/1
200 TABLET ORAL DAILY
Status: DISCONTINUED | OUTPATIENT
Start: 2025-03-12 | End: 2025-03-12 | Stop reason: HOSPADM

## 2025-03-11 RX ORDER — LIDOCAINE HYDROCHLORIDE 20 MG/ML
INJECTION, SOLUTION EPIDURAL; INFILTRATION; INTRACAUDAL; PERINEURAL AS NEEDED
Status: DISCONTINUED | OUTPATIENT
Start: 2025-03-11 | End: 2025-03-11

## 2025-03-11 RX ORDER — PHENAZOPYRIDINE HYDROCHLORIDE 200 MG/1
200 TABLET, FILM COATED ORAL 3 TIMES DAILY PRN
Qty: 10 TABLET | Refills: 0 | Status: SHIPPED | OUTPATIENT
Start: 2025-03-11

## 2025-03-11 RX ORDER — FENTANYL CITRATE 50 UG/ML
50 INJECTION, SOLUTION INTRAMUSCULAR; INTRAVENOUS EVERY 5 MIN PRN
Status: DISCONTINUED | OUTPATIENT
Start: 2025-03-11 | End: 2025-03-11 | Stop reason: HOSPADM

## 2025-03-11 RX ORDER — METOPROLOL TARTRATE 25 MG/1
25 TABLET, FILM COATED ORAL 2 TIMES DAILY
Status: DISCONTINUED | OUTPATIENT
Start: 2025-03-11 | End: 2025-03-12 | Stop reason: HOSPADM

## 2025-03-11 RX ORDER — IBUPROFEN 600 MG/1
600 TABLET ORAL EVERY 6 HOURS PRN
Qty: 30 TABLET | Refills: 0 | Status: SHIPPED | OUTPATIENT
Start: 2025-03-11

## 2025-03-11 RX ORDER — GENTAMICIN 40 MG/ML
INJECTION, SOLUTION INTRAMUSCULAR; INTRAVENOUS AS NEEDED
Status: DISCONTINUED | OUTPATIENT
Start: 2025-03-11 | End: 2025-03-11

## 2025-03-11 RX ORDER — NORETHINDRONE AND ETHINYL ESTRADIOL 0.5-0.035
KIT ORAL AS NEEDED
Status: DISCONTINUED | OUTPATIENT
Start: 2025-03-11 | End: 2025-03-11

## 2025-03-11 RX ORDER — AMOXICILLIN 500 MG/1
500 CAPSULE ORAL 3 TIMES DAILY
Qty: 15 CAPSULE | Refills: 0 | Status: SHIPPED | OUTPATIENT
Start: 2025-03-11 | End: 2025-03-16

## 2025-03-11 RX ORDER — OXYCODONE HYDROCHLORIDE 5 MG/1
5 TABLET ORAL EVERY 6 HOURS PRN
Qty: 5 TABLET | Refills: 0 | Status: SHIPPED | OUTPATIENT
Start: 2025-03-11

## 2025-03-11 RX ORDER — MIDAZOLAM HYDROCHLORIDE 1 MG/ML
INJECTION INTRAMUSCULAR; INTRAVENOUS AS NEEDED
Status: DISCONTINUED | OUTPATIENT
Start: 2025-03-11 | End: 2025-03-11

## 2025-03-11 RX ORDER — ONDANSETRON 4 MG/1
4 TABLET, FILM COATED ORAL EVERY 8 HOURS PRN
Status: DISCONTINUED | OUTPATIENT
Start: 2025-03-11 | End: 2025-03-12 | Stop reason: HOSPADM

## 2025-03-11 RX ORDER — POLYETHYLENE GLYCOL 3350 17 G/17G
17 POWDER, FOR SOLUTION ORAL DAILY
Qty: 10 PACKET | Refills: 0 | Status: SHIPPED | OUTPATIENT
Start: 2025-03-11

## 2025-03-11 RX ORDER — NITROGLYCERIN 0.4 MG/1
0.4 TABLET SUBLINGUAL EVERY 5 MIN PRN
Status: DISCONTINUED | OUTPATIENT
Start: 2025-03-11 | End: 2025-03-12 | Stop reason: HOSPADM

## 2025-03-11 RX ORDER — ACETAMINOPHEN 325 MG/1
975 TABLET ORAL EVERY 6 HOURS
Status: DISCONTINUED | OUTPATIENT
Start: 2025-03-11 | End: 2025-03-12 | Stop reason: HOSPADM

## 2025-03-11 RX ORDER — PHENAZOPYRIDINE HYDROCHLORIDE 100 MG/1
200 TABLET, FILM COATED ORAL
Status: DISCONTINUED | OUTPATIENT
Start: 2025-03-11 | End: 2025-03-12 | Stop reason: HOSPADM

## 2025-03-11 RX ORDER — DOXAZOSIN 2 MG/1
1 TABLET ORAL
Status: DISCONTINUED | OUTPATIENT
Start: 2025-03-12 | End: 2025-03-12 | Stop reason: HOSPADM

## 2025-03-11 RX ORDER — ATORVASTATIN CALCIUM 80 MG/1
80 TABLET, FILM COATED ORAL DAILY
Status: DISCONTINUED | OUTPATIENT
Start: 2025-03-11 | End: 2025-03-12 | Stop reason: HOSPADM

## 2025-03-11 RX ORDER — SODIUM CHLORIDE, SODIUM LACTATE, POTASSIUM CHLORIDE, CALCIUM CHLORIDE 600; 310; 30; 20 MG/100ML; MG/100ML; MG/100ML; MG/100ML
100 INJECTION, SOLUTION INTRAVENOUS CONTINUOUS
Status: DISCONTINUED | OUTPATIENT
Start: 2025-03-11 | End: 2025-03-11 | Stop reason: HOSPADM

## 2025-03-11 RX ORDER — ONDANSETRON HYDROCHLORIDE 2 MG/ML
4 INJECTION, SOLUTION INTRAVENOUS EVERY 8 HOURS PRN
Status: DISCONTINUED | OUTPATIENT
Start: 2025-03-11 | End: 2025-03-12 | Stop reason: HOSPADM

## 2025-03-11 RX ORDER — OXYCODONE HYDROCHLORIDE 5 MG/1
10 TABLET ORAL EVERY 4 HOURS PRN
Status: DISCONTINUED | OUTPATIENT
Start: 2025-03-11 | End: 2025-03-12 | Stop reason: HOSPADM

## 2025-03-11 RX ORDER — OXYCODONE HYDROCHLORIDE 5 MG/1
5 TABLET ORAL EVERY 6 HOURS PRN
Status: DISCONTINUED | OUTPATIENT
Start: 2025-03-11 | End: 2025-03-12 | Stop reason: HOSPADM

## 2025-03-11 RX ORDER — SODIUM CHLORIDE, SODIUM LACTATE, POTASSIUM CHLORIDE, CALCIUM CHLORIDE 600; 310; 30; 20 MG/100ML; MG/100ML; MG/100ML; MG/100ML
INJECTION, SOLUTION INTRAVENOUS CONTINUOUS PRN
Status: DISCONTINUED | OUTPATIENT
Start: 2025-03-11 | End: 2025-03-11

## 2025-03-11 RX ADMIN — AMOXICILLIN 500 MG: 500 CAPSULE ORAL at 21:40

## 2025-03-11 RX ADMIN — EPHEDRINE SULFATE 10 MG: 50 INJECTION, SOLUTION INTRAVENOUS at 12:21

## 2025-03-11 RX ADMIN — PROPOFOL 150 MG: 10 INJECTION, EMULSION INTRAVENOUS at 11:40

## 2025-03-11 RX ADMIN — CEFAZOLIN 2 G: 1 INJECTION, POWDER, FOR SOLUTION INTRAMUSCULAR; INTRAVENOUS at 11:48

## 2025-03-11 RX ADMIN — SODIUM CHLORIDE, SODIUM LACTATE, POTASSIUM CHLORIDE, AND CALCIUM CHLORIDE: .6; .31; .03; .02 INJECTION, SOLUTION INTRAVENOUS at 11:37

## 2025-03-11 RX ADMIN — EPHEDRINE SULFATE 5 MG: 50 INJECTION, SOLUTION INTRAVENOUS at 11:54

## 2025-03-11 RX ADMIN — PHENAZOPYRIDINE 200 MG: 100 TABLET ORAL at 17:54

## 2025-03-11 RX ADMIN — OXYCODONE HYDROCHLORIDE 5 MG: 5 TABLET ORAL at 13:29

## 2025-03-11 RX ADMIN — ACETAMINOPHEN 975 MG: 325 TABLET, FILM COATED ORAL at 23:07

## 2025-03-11 RX ADMIN — ONDANSETRON 4 MG: 2 INJECTION, SOLUTION INTRAMUSCULAR; INTRAVENOUS at 11:47

## 2025-03-11 RX ADMIN — MIDAZOLAM HYDROCHLORIDE 2 MG: 1 INJECTION, SOLUTION INTRAMUSCULAR; INTRAVENOUS at 11:37

## 2025-03-11 RX ADMIN — LIDOCAINE HYDROCHLORIDE 100 MG: 20 INJECTION, SOLUTION EPIDURAL; INFILTRATION; INTRACAUDAL; PERINEURAL at 11:40

## 2025-03-11 RX ADMIN — GENTAMICIN SULFATE 120 MG: 40 INJECTION, SOLUTION INTRAMUSCULAR; INTRAVENOUS at 11:47

## 2025-03-11 RX ADMIN — PROPOFOL 50 MG: 10 INJECTION, EMULSION INTRAVENOUS at 11:46

## 2025-03-11 RX ADMIN — ATORVASTATIN CALCIUM 80 MG: 80 TABLET, FILM COATED ORAL at 21:40

## 2025-03-11 RX ADMIN — METOPROLOL TARTRATE 25 MG: 25 TABLET, FILM COATED ORAL at 21:40

## 2025-03-11 RX ADMIN — FENTANYL CITRATE 100 MCG: 50 INJECTION, SOLUTION INTRAMUSCULAR; INTRAVENOUS at 11:40

## 2025-03-11 RX ADMIN — DEXAMETHASONE SODIUM PHOSPHATE 4 MG: 4 INJECTION, SOLUTION INTRAMUSCULAR; INTRAVENOUS at 11:43

## 2025-03-11 RX ADMIN — ACETAMINOPHEN 975 MG: 325 TABLET, FILM COATED ORAL at 17:54

## 2025-03-11 SDOH — SOCIAL STABILITY: SOCIAL INSECURITY: WERE YOU ABLE TO COMPLETE ALL THE BEHAVIORAL HEALTH SCREENINGS?: YES

## 2025-03-11 SDOH — ECONOMIC STABILITY: FOOD INSECURITY: WITHIN THE PAST 12 MONTHS, THE FOOD YOU BOUGHT JUST DIDN'T LAST AND YOU DIDN'T HAVE MONEY TO GET MORE.: NEVER TRUE

## 2025-03-11 SDOH — SOCIAL STABILITY: SOCIAL INSECURITY: HAS ANYONE EVER THREATENED TO HURT YOUR FAMILY OR YOUR PETS?: NO

## 2025-03-11 SDOH — ECONOMIC STABILITY: TRANSPORTATION INSECURITY: IN THE PAST 12 MONTHS, HAS LACK OF TRANSPORTATION KEPT YOU FROM MEDICAL APPOINTMENTS OR FROM GETTING MEDICATIONS?: NO

## 2025-03-11 SDOH — ECONOMIC STABILITY: INCOME INSECURITY: IN THE PAST 12 MONTHS HAS THE ELECTRIC, GAS, OIL, OR WATER COMPANY THREATENED TO SHUT OFF SERVICES IN YOUR HOME?: NO

## 2025-03-11 SDOH — SOCIAL STABILITY: SOCIAL INSECURITY: HAVE YOU HAD THOUGHTS OF HARMING ANYONE ELSE?: NO

## 2025-03-11 SDOH — ECONOMIC STABILITY: FOOD INSECURITY: WITHIN THE PAST 12 MONTHS, YOU WORRIED THAT YOUR FOOD WOULD RUN OUT BEFORE YOU GOT THE MONEY TO BUY MORE.: NEVER TRUE

## 2025-03-11 SDOH — SOCIAL STABILITY: SOCIAL INSECURITY: ABUSE: ADULT

## 2025-03-11 SDOH — SOCIAL STABILITY: SOCIAL INSECURITY: WITHIN THE LAST YEAR, HAVE YOU BEEN AFRAID OF YOUR PARTNER OR EX-PARTNER?: NO

## 2025-03-11 SDOH — ECONOMIC STABILITY: HOUSING INSECURITY: IN THE LAST 12 MONTHS, WAS THERE A TIME WHEN YOU WERE NOT ABLE TO PAY THE MORTGAGE OR RENT ON TIME?: NO

## 2025-03-11 SDOH — SOCIAL STABILITY: SOCIAL INSECURITY
WITHIN THE LAST YEAR, HAVE YOU BEEN KICKED, HIT, SLAPPED, OR OTHERWISE PHYSICALLY HURT BY YOUR PARTNER OR EX-PARTNER?: NO

## 2025-03-11 SDOH — SOCIAL STABILITY: SOCIAL INSECURITY: WITHIN THE LAST YEAR, HAVE YOU BEEN HUMILIATED OR EMOTIONALLY ABUSED IN OTHER WAYS BY YOUR PARTNER OR EX-PARTNER?: NO

## 2025-03-11 SDOH — ECONOMIC STABILITY: HOUSING INSECURITY: IN THE PAST 12 MONTHS, HOW MANY TIMES HAVE YOU MOVED WHERE YOU WERE LIVING?: 0

## 2025-03-11 SDOH — SOCIAL STABILITY: SOCIAL INSECURITY: ARE YOU OR HAVE YOU BEEN THREATENED OR ABUSED PHYSICALLY, EMOTIONALLY, OR SEXUALLY BY ANYONE?: NO

## 2025-03-11 SDOH — SOCIAL STABILITY: SOCIAL INSECURITY
WITHIN THE LAST YEAR, HAVE YOU BEEN RAPED OR FORCED TO HAVE ANY KIND OF SEXUAL ACTIVITY BY YOUR PARTNER OR EX-PARTNER?: NO

## 2025-03-11 SDOH — ECONOMIC STABILITY: HOUSING INSECURITY: AT ANY TIME IN THE PAST 12 MONTHS, WERE YOU HOMELESS OR LIVING IN A SHELTER (INCLUDING NOW)?: NO

## 2025-03-11 SDOH — ECONOMIC STABILITY: FOOD INSECURITY: HOW HARD IS IT FOR YOU TO PAY FOR THE VERY BASICS LIKE FOOD, HOUSING, MEDICAL CARE, AND HEATING?: NOT HARD AT ALL

## 2025-03-11 SDOH — SOCIAL STABILITY: SOCIAL INSECURITY: DO YOU FEEL UNSAFE GOING BACK TO THE PLACE WHERE YOU ARE LIVING?: NO

## 2025-03-11 SDOH — HEALTH STABILITY: MENTAL HEALTH: CURRENT SMOKER: 0

## 2025-03-11 SDOH — SOCIAL STABILITY: SOCIAL INSECURITY: DO YOU FEEL ANYONE HAS EXPLOITED OR TAKEN ADVANTAGE OF YOU FINANCIALLY OR OF YOUR PERSONAL PROPERTY?: NO

## 2025-03-11 SDOH — SOCIAL STABILITY: SOCIAL INSECURITY: DOES ANYONE TRY TO KEEP YOU FROM HAVING/CONTACTING OTHER FRIENDS OR DOING THINGS OUTSIDE YOUR HOME?: NO

## 2025-03-11 SDOH — SOCIAL STABILITY: SOCIAL INSECURITY: ARE THERE ANY APPARENT SIGNS OF INJURIES/BEHAVIORS THAT COULD BE RELATED TO ABUSE/NEGLECT?: NO

## 2025-03-11 ASSESSMENT — COGNITIVE AND FUNCTIONAL STATUS - GENERAL
MOVING TO AND FROM BED TO CHAIR: A LITTLE
DRESSING REGULAR LOWER BODY CLOTHING: A LITTLE
TURNING FROM BACK TO SIDE WHILE IN FLAT BAD: A LITTLE
PATIENT BASELINE BEDBOUND: NO
TURNING FROM BACK TO SIDE WHILE IN FLAT BAD: A LITTLE
TURNING FROM BACK TO SIDE WHILE IN FLAT BAD: A LITTLE
STANDING UP FROM CHAIR USING ARMS: A LITTLE
DRESSING REGULAR LOWER BODY CLOTHING: A LITTLE
WALKING IN HOSPITAL ROOM: A LITTLE
MOBILITY SCORE: 18
CLIMB 3 TO 5 STEPS WITH RAILING: A LITTLE
CLIMB 3 TO 5 STEPS WITH RAILING: A LITTLE
DAILY ACTIVITIY SCORE: 23
MOBILITY SCORE: 18
WALKING IN HOSPITAL ROOM: A LITTLE
MOVING FROM LYING ON BACK TO SITTING ON SIDE OF FLAT BED WITH BEDRAILS: A LITTLE
DRESSING REGULAR LOWER BODY CLOTHING: A LITTLE
MOBILITY SCORE: 18
STANDING UP FROM CHAIR USING ARMS: A LITTLE
WALKING IN HOSPITAL ROOM: A LITTLE
DRESSING REGULAR LOWER BODY CLOTHING: A LITTLE
MOVING TO AND FROM BED TO CHAIR: A LITTLE
DAILY ACTIVITIY SCORE: 23
CLIMB 3 TO 5 STEPS WITH RAILING: A LITTLE
MOVING FROM LYING ON BACK TO SITTING ON SIDE OF FLAT BED WITH BEDRAILS: A LITTLE
STANDING UP FROM CHAIR USING ARMS: A LITTLE
MOBILITY SCORE: 18
DAILY ACTIVITIY SCORE: 23
MOVING TO AND FROM BED TO CHAIR: A LITTLE
TURNING FROM BACK TO SIDE WHILE IN FLAT BAD: A LITTLE
DAILY ACTIVITIY SCORE: 23
MOVING TO AND FROM BED TO CHAIR: A LITTLE
MOVING FROM LYING ON BACK TO SITTING ON SIDE OF FLAT BED WITH BEDRAILS: A LITTLE
CLIMB 3 TO 5 STEPS WITH RAILING: A LITTLE
MOVING FROM LYING ON BACK TO SITTING ON SIDE OF FLAT BED WITH BEDRAILS: A LITTLE
STANDING UP FROM CHAIR USING ARMS: A LITTLE
WALKING IN HOSPITAL ROOM: A LITTLE

## 2025-03-11 ASSESSMENT — PAIN - FUNCTIONAL ASSESSMENT
PAIN_FUNCTIONAL_ASSESSMENT: 0-10

## 2025-03-11 ASSESSMENT — ACTIVITIES OF DAILY LIVING (ADL)
JUDGMENT_ADEQUATE_SAFELY_COMPLETE_DAILY_ACTIVITIES: YES
BATHING: INDEPENDENT
GROOMING: INDEPENDENT
WALKS IN HOME: INDEPENDENT
HEARING - LEFT EAR: FUNCTIONAL
PATIENT'S MEMORY ADEQUATE TO SAFELY COMPLETE DAILY ACTIVITIES?: YES
ADEQUATE_TO_COMPLETE_ADL: YES
DRESSING YOURSELF: INDEPENDENT
HEARING - RIGHT EAR: FUNCTIONAL
FEEDING YOURSELF: INDEPENDENT
LACK_OF_TRANSPORTATION: NO
TOILETING: INDEPENDENT

## 2025-03-11 ASSESSMENT — LIFESTYLE VARIABLES
SKIP TO QUESTIONS 9-10: 1
AUDIT-C TOTAL SCORE: 0
HOW OFTEN DO YOU HAVE 6 OR MORE DRINKS ON ONE OCCASION: NEVER
HOW OFTEN DO YOU HAVE A DRINK CONTAINING ALCOHOL: NEVER
AUDIT-C TOTAL SCORE: 0
HOW MANY STANDARD DRINKS CONTAINING ALCOHOL DO YOU HAVE ON A TYPICAL DAY: PATIENT DOES NOT DRINK

## 2025-03-11 ASSESSMENT — PAIN SCALES - GENERAL
PAINLEVEL_OUTOF10: 2
PAINLEVEL_OUTOF10: 3
PAINLEVEL_OUTOF10: 2
PAINLEVEL_OUTOF10: 0 - NO PAIN
PAINLEVEL_OUTOF10: 2
PAINLEVEL_OUTOF10: 3
PAINLEVEL_OUTOF10: 2
PAINLEVEL_OUTOF10: 0 - NO PAIN

## 2025-03-11 ASSESSMENT — PATIENT HEALTH QUESTIONNAIRE - PHQ9
1. LITTLE INTEREST OR PLEASURE IN DOING THINGS: NOT AT ALL
SUM OF ALL RESPONSES TO PHQ9 QUESTIONS 1 & 2: 0
2. FEELING DOWN, DEPRESSED OR HOPELESS: NOT AT ALL

## 2025-03-11 NOTE — ANESTHESIA PROCEDURE NOTES
Airway  Date/Time: 3/11/2025 11:45 AM  Urgency: elective    Airway not difficult    Staffing  Performed: [facility]   Authorized by: Morteza Sandhu MD    Performed by: Star Camejo RN  Patient location during procedure: OR    Indications and Patient Condition  Indications for airway management: anesthesia  Spontaneous Ventilation: absent  Sedation level: deep  Preoxygenated: yes  Patient position: sniffing  MILS maintained throughout  Mask difficulty assessment: 1 - vent by mask    Final Airway Details  Final airway type: supraglottic airway      Successful airway: Supraglottic airway: i gel.  Size 4     Number of attempts at approach: 1

## 2025-03-11 NOTE — ANESTHESIA PREPROCEDURE EVALUATION
Patient: Trent Sandhu    Procedure Information       Date/Time: 03/11/25 1130    Procedure: Anatomical Endoscopic Prostate Enucleation ~ HoLEP (Prostate)    Location: [facility] OR 04 / [facility] A OR    Surgeons: Collin Carpenter MD            Relevant Problems   Anesthesia (within normal limits)      Cardiac   (+) ASHD (arteriosclerotic heart disease)   (+) HTN (hypertension)   (+) Hyperlipemia      Pulmonary (within normal limits)      Neuro (within normal limits)      GI (within normal limits)      /Renal   (+) Benign prostatic hyperplasia with urinary retention      Liver (within normal limits)      Endocrine (within normal limits)      Hematology (within normal limits)      Musculoskeletal (within normal limits)      HEENT (within normal limits)  PROSTHETIC LEFT EYE      ID (within normal limits)      Skin (within normal limits)       Clinical information reviewed:   Tobacco  Allergies  Meds   Med Hx  Surg Hx   Fam Hx  Soc Hx        NPO Detail:  NPO/Void Status  Date of Last Liquid: 03/11/25  Time of Last Liquid: 0730  Date of Last Solid: 03/10/25  Time of Last Solid: 2350  Last Intake Type: Clear fluids         Physical Exam    Airway  Mallampati: I  TM distance: >3 FB  Neck ROM: full     Cardiovascular - normal exam     Dental - normal exam     Pulmonary - normal exam     Abdominal - normal exam           Anesthesia Plan    History of general anesthesia?: yes  History of complications of general anesthesia?: no    ASA 2     general     The patient is not a current smoker.    intravenous induction   Anesthetic plan and risks discussed with patient.  Use of blood products discussed with patient who consented to blood products.

## 2025-03-11 NOTE — OP NOTE
Anatomical Endoscopic Prostate Enucleation ~ HoLEP Operative Note     Date: 3/11/2025  OR Location: [facility] A OR    Name: Trent Sandhu, : 1955, Age: 70 y.o., MRN: [id number], Sex: male    Diagnosis  Pre-op Diagnosis      * Obstructive uropathy [N13.9]     * Benign prostatic hyperplasia with urinary retention [N40.1, R33.8] Post-op Diagnosis     * Obstructive uropathy [N13.9]     * Benign prostatic hyperplasia with urinary retention [N40.1, R33.8]     Procedures  Anatomical Endoscopic Prostate Enucleation ~ HoLEP  82206 - HI LASER ENUCLEATION PROSTATE W/MORCELLATION      Surgeons      * Collin Carpenter - Primary    Resident/Fellow/Other Assistant:  Surgeons and Role:     * Loco Ruiz MD - Resident - Assisting    Staff:   Circulator: Emily Youub Person: Jessi Stewart Circulator: Archana Stewart Scrub: Sukhwinder    Anesthesia Staff: Anesthesiologist: Morteza Sandhu MD  CRNA: LUCY Stephens-CRNA    Procedure Summary  Anesthesia: General  ASA: II  Estimated Blood Loss: 15mL  Intra-op Medications:   Administrations occurring from 1130 to 1300 on 25:   Medication Name Total Dose   ceFAZolin (Ancef) vial 1 g 2 g   dexAMETHasone (Decadron) injection 4 mg/mL 4 mg   ePHEDrine injection 15 mg   fentaNYL (Sublimaze) injection 50 mcg/mL 100 mcg   gentamicin (Garamycin) injection 40 mg/mL 120 mg   LR infusion Cannot be calculated   lidocaine PF (Xylocaine-MPF) local injection 2 % 100 mg   midazolam PF (Versed) injection 1 mg/mL 2 mg   ondansetron (Zofran) 2 mg/mL injection 4 mg   propofol (Diprivan) injection 10 mg/mL 200 mg              Anesthesia Record               Intraprocedure I/O Totals          Intake    LR infusion 100.00 mL    Total Intake 100 mL          Specimen:   ID Type Source Tests Collected by Time   1 : prostate tissue Tissue PROSTATE HOLEP SURGICAL PATHOLOGY EXAM Collin Carpenter MD 3/11/2025 1202                 Drains and/or Catheters: * None in log *    Tourniquet Times:          Implants:     Findings: significant residual adenoma. Uncomplicated HoLEP    Indications: Trent Sandhu is an 70 y.o. male who is having surgery for Obstructive uropathy [N13.9]  Benign prostatic hyperplasia with urinary retention [N40.1, R33.8].     The patient was seen in the preoperative area. The risks, benefits, complications, treatment options, non-operative alternatives, expected recovery and outcomes were discussed with the patient. The possibilities of reaction to medication, pulmonary aspiration, injury to surrounding structures, bleeding, recurrent infection, the need for additional procedures, failure to diagnose a condition, and creating a complication requiring transfusion or operation were discussed with the patient. The patient concurred with the proposed plan, giving informed consent.  The site of surgery was properly noted/marked if necessary per policy. The patient has been actively warmed in preoperative area. Preoperative antibiotics have been ordered and given within 1 hours of incision. Venous thrombosis prophylaxis have been ordered including bilateral sequential compression devices    Procedure Details:   1. HOLMIUM LASER ENUCLEATION OF THE PROSTATE     Laser Settings Used:  Cutting 2 J, 40 Hz.  Coagulation 1.5 J, 30 Hz.   Samir or Virtual basket used? Samir  Preoperative Prostate Size:  approx 80 cubic centimeters.     Prostate configuration: bilobar s/p prior TURP  Complication: none  EBL: 15 ml  Catheter: 22Fr 3 way  Antibiotics: ancef, gent  Specimen: Morcellated prostatic tissue.    DESCRIPTION OF PROCEDURE:      The patient was brought to the OR and after good general anesthesia was achieved, the patient was prepped and draped in dorsal lithotomy position. All pressure points were padded.  Surgical pause was performed.  The patient was identified using 2 identifiers.  The correct surgical procedure was confirmed.  All members of surgical and anesthesia team were in agreement.  The  patient received prophylactic antibiotic and the procedure started.    Cystoscopy: The patient's bladder was first entered with a 22-Serbian rigid cystoscope with 30-degree lens.  Cystoscopic examination showed normal anterior urethra.  The posterior urethra showed significant residual obstructing adenoma.  Both ureteral orifices were identified away from the bladder neck. The cystoscope was removed and the meatus was dilated up to 28-Serbian using sounds.  The urethra was then filled with lidocaine gel and a 26-Serbian Emerson continuous-flow resectoscope was placed.  The holmium laser apparatus was placed through the sheath.  Using a 550-micron end-firing quartz laser fiber, a laser bridge, and a 7-Serbian laser stabilizing catheter, the procedure was started with the above-mentioned laser setting.    A en bloc technique was performed with an initial incision at the apex and circumferentially using low energy.  We continued to develop our anterior plane at the apex, identifying the capsule and freeing up the anterior apex well within the sphincter. We then proceeded with our posterior dissection, developing the posterior space toward the bladder neck and continuing our incision laterally to 9 and 3:00.    Then we turned our attention to the lateral attachments of the prostate.  Using laser energy, taking care to avoid any damage to the sphincter, we connected our previously dissected anterior and posterior planes to completely liberate the apex of the prostate preserving the sphincter. We then continued our dissection circumferentially, freeing the prostate systematically from apex to its attachments at the bladder neck. The adenoma was then pushed into the bladder.     Once the prostate had been fully enucleated, the laser was defocused on any sources of bleeding to get additional hemostasis.  There was good hemostasis at the conclusion of this procedure.  A few small remaining nodular adenomas were then resected from  the capsule.      The laser bridge apparatus and the resectoscope were then removed and the offset Emerson nephroscope and Emerson - Cheryl tissue morcellator were then introduced and used to completely morcellate the tissue.  Two inflows were used during this portion of the procedure to fully distend the bladder and to prevent any inadvertent bladder injury.  The bladder was then ellik'd out after insertion of the inner sheath and reinspected with the cystoscope showing no residual adenomas.  Hemostasis was ensured at the conclusion of the procedure and both ureteral orifices were confirmed and identified well away from the area of resection.  No residual adenoma could be identified.     Following that, a three-way Cox catheter on a guide was placed into the bladder and the balloon was filled.  The bladder was irrigated near clear and the continuous irrigation was started.     The patient tolerated the procedure well and was shifted to recovery in good general condition    Complications:  None; patient tolerated the procedure well.    Disposition: PACU - hemodynamically stable.  Condition: stable                 Additional Details:     Attending Attestation: I was present and scrubbed for the entire procedure.    Collin Carpenter  Phone Number: [phone]

## 2025-03-11 NOTE — CARE PLAN
The patient's goals for the shift include      The clinical goals for the shift include pain managed through shift    Problem: Pain - Adult  Goal: Verbalizes/displays adequate comfort level or baseline comfort level  Outcome: Progressing     Problem: Safety - Adult  Goal: Free from fall injury  Outcome: Progressing     Problem: Discharge Planning  Goal: Discharge to home or other facility with appropriate resources  Outcome: Progressing     Problem: Chronic Conditions and Co-morbidities  Goal: Patient's chronic conditions and co-morbidity symptoms are monitored and maintained or improved  Outcome: Progressing     Problem: Nutrition  Goal: Nutrient intake appropriate for maintaining nutritional needs  Outcome: Progressing

## 2025-03-11 NOTE — ANESTHESIA POSTPROCEDURE EVALUATION
Patient: Trent Sandhu    Procedure Summary       Date: 03/11/25 Room / Location: [facility] OR 04 / [facility] OR    Anesthesia Start: 1137 Anesthesia Stop: 1307    Procedure: Anatomical Endoscopic Prostate Enucleation ~ HoLEP (Prostate) Diagnosis:       Obstructive uropathy      Benign prostatic hyperplasia with urinary retention      (Obstructive uropathy [N13.9])      (Benign prostatic hyperplasia with urinary retention [N40.1, R33.8])    Surgeons: Collin Carpenter MD Responsible Provider: Morteza Sandhu MD    Anesthesia Type: general ASA Status: 2            Anesthesia Type: general    Vitals Value Taken Time   /90 03/11/25 1431   Temp 36.4 °C (97.5 °F) 03/11/25 1302   Pulse 80 03/11/25 1435   Resp 18 03/11/25 1425   SpO2 99 % 03/11/25 1435   Vitals shown include unfiled device data.    Anesthesia Post Evaluation    Patient location during evaluation: bedside  Patient participation: complete - patient participated  Level of consciousness: awake and alert  Pain management: adequate  Airway patency: patent  Cardiovascular status: acceptable  Respiratory status: acceptable  Hydration status: acceptable  Postoperative Nausea and Vomiting: none    There were no known notable events for this encounter.

## 2025-03-11 NOTE — DISCHARGE INSTRUCTIONS
DEPARTMENT OF UROLOGY  DISCHARGE INSTRUCTIONS [facility]  Outpatient Surgery    C O N F I D E N T I A L   I N F O R M A T I O N    Trent Sandhu    Call [phone] during regular daytime business hours (8:00 am - 5:00 pm) and after 5:00 pm and ask for the Urology resident with any questions or concerns.      If it is a life-threatening situation, proceed to the nearest emergency department.        Follow-up appointments:    3/13/25 (Catheter removal)  3/26/25 (Post-op check)    Thank you for the opportunity to care for you today.  Your health and healing are very important to us.  We hope we made you feel as comfortable as possible and are committed to your recovery and continued well-being.      The following is a brief overview of your procedure (holmium laser enucleation of the prostate) today. Some of the information contained on this summary may be confidential.  This information should be kept in your records and should be shared with your regular doctor.    Physicians:   Dr. Carpenter      Procedure performed: Prostate Resection  Pending results:   pathology of tissue taken from your prostate    What to Expect During your Recovery and Home Care  Anesthesia Side Effects   You received anesthesia today.  You may feel sleepy, tired, or have a sore throat.   You may also feel drowsiness, dizziness, or inability to think clearly.  For your safety, do not drive, drink alcoholic beverages, take any unprescribed medication or make any important decisions for 24 hours.  A responsible adult should be with you for 24 hours.        Activity and Recovery    No heavy lifting over ten pounds. Limit activity while urinary catheter is in place. Avoid activities that would cause pulling or tugging on your catheter.    Do not drive or operate heavy machinery while taking narcotic pain medications as these medications can alter perception, impair judgement, and slow reaction times.    Pain Control  Unfortunately, you may experience pain  after your procedure.  Adequate management can include alternative measures to help ease your pain and can include over the counter Tylenol / Ibuprofen and hot packs. Do not take more than 4,000mg of Tylenol in a 24-hour period.  Oxycodone can be taken as prescribed as needed for breakthrough pain. Oxycodone is a narcotic and should only be taken for pain that cannot be controlled with other medications. Narcotics cause constipation, take a stool softener (such as Miralax) if using this medication.    You may have been prescribed pyridium (Azo) which can help with burning with urination. Pyridium turns your urine bright orange or rust colored, this is normal. You may also experience bladder spasms due to the catheter. Oxybutynin (Ditropan) may be prescribed to help alleviate this problem.    Nausea/Vomiting   Clear liquids are best tolerated at first. Start slow, advance your diet as tolerated to normal foods. Avoid spicy, greasy, heavy foods at first. Also, you may feel nauseous or like you need to vomit if you take any type of medication on an empty stomach.  Call your physician if you are unable to eat or drink and have persistent vomiting.    Signs of Bleeding   You are going to have some blood in your urine. Your urine will be light pink to yellow. You always want to look at the urine in the tubing of your catheter and not in the large urine collection bag to check for bleeding. If urine becomes thick dark apryl red, has large clots or stops draining, please notify your physician.    Wait until three days after your surgery to resume your blood thinner medication.    Treatment/wound care:   Keep area(s) clean and dry. Clean around the tip or your penis were the catheter goes in daily with mild soap and water.  It is okay to shower 24 hours after time of surgery.    Do not submerge your catheter in standing water until seen for follow up appointment (no tub bathing, swimming, or hot tubs).      Signs of  Infection  Signs of infection can include fever, drainage(green/yellow), chills, burning sensation with passing of urine, catheter leakage, or severe abdominal pain.  If you see any of these occur, please contact your doctor's office at [phone].  Any fever higher than 100.4, especially if associated with an ill feeling, abdominal pain, chills, or nausea should be reported to your surgeon.      Assist in bowel movements/urination  Increase fiber in diet  Increase water (6 to 8 glasses)  Increase walking     Additional Instructions: CATHETER CARE  Always keep the catheters tubing and drainage bag below the level of your bladder.  Avoid loops and kinks in the catheter tubing.  NOTIFY your physician if catheter falls out or catheter seems clogged and urine is not draining.   Do not wear the small leg bag to bed you should be provided with a larger overnight bag that you should wear to bed and can hang over the side of the bed.  We recommend wearing the large bag in the shower, as this is easy to dry, and you do not get your leg straps wet from your leg bag.   Your catheter should be secured to your upper thigh, do not allow it to hang or dangle.  Your catheter will be removed at your post-operative appointment.

## 2025-03-12 VITALS
HEART RATE: 81 BPM | BODY MASS INDEX: 23.61 KG/M2 | SYSTOLIC BLOOD PRESSURE: 106 MMHG | TEMPERATURE: 98.1 F | OXYGEN SATURATION: 99 % | HEIGHT: 69 IN | WEIGHT: 159.39 LBS | DIASTOLIC BLOOD PRESSURE: 60 MMHG | RESPIRATION RATE: 18 BRPM

## 2025-03-12 PROCEDURE — 7100000011 HC EXTENDED STAY RECOVERY HOURLY - NURSING UNIT

## 2025-03-12 PROCEDURE — 2500000004 HC RX 250 GENERAL PHARMACY W/ HCPCS (ALT 636 FOR OP/ED): Performed by: STUDENT IN AN ORGANIZED HEALTH CARE EDUCATION/TRAINING PROGRAM

## 2025-03-12 PROCEDURE — 2500000002 HC RX 250 W HCPCS SELF ADMINISTERED DRUGS (ALT 637 FOR MEDICARE OP, ALT 636 FOR OP/ED): Performed by: STUDENT IN AN ORGANIZED HEALTH CARE EDUCATION/TRAINING PROGRAM

## 2025-03-12 PROCEDURE — 99239 HOSP IP/OBS DSCHRG MGMT >30: CPT | Performed by: NURSE PRACTITIONER

## 2025-03-12 PROCEDURE — 2500000001 HC RX 250 WO HCPCS SELF ADMINISTERED DRUGS (ALT 637 FOR MEDICARE OP): Performed by: STUDENT IN AN ORGANIZED HEALTH CARE EDUCATION/TRAINING PROGRAM

## 2025-03-12 RX ADMIN — ACETAMINOPHEN 975 MG: 325 TABLET, FILM COATED ORAL at 05:27

## 2025-03-12 RX ADMIN — PHENAZOPYRIDINE 200 MG: 100 TABLET ORAL at 08:59

## 2025-03-12 RX ADMIN — POLYETHYLENE GLYCOL 3350 17 G: 17 POWDER, FOR SOLUTION ORAL at 00:59

## 2025-03-12 RX ADMIN — AMOXICILLIN 500 MG: 500 CAPSULE ORAL at 05:27

## 2025-03-12 RX ADMIN — METOPROLOL TARTRATE 25 MG: 25 TABLET, FILM COATED ORAL at 09:00

## 2025-03-12 RX ADMIN — PHENAZOPYRIDINE 200 MG: 100 TABLET ORAL at 11:17

## 2025-03-12 RX ADMIN — CALCITRIOL CAPSULES 0.25 MCG 0.25 MCG: 0.25 CAPSULE ORAL at 08:59

## 2025-03-12 RX ADMIN — ACETAMINOPHEN 975 MG: 325 TABLET, FILM COATED ORAL at 11:17

## 2025-03-12 RX ADMIN — ALLOPURINOL 200 MG: 100 TABLET ORAL at 08:59

## 2025-03-12 RX ADMIN — DOXAZOSIN 1 MG: 2 TABLET ORAL at 05:30

## 2025-03-12 RX ADMIN — AMOXICILLIN 500 MG: 500 CAPSULE ORAL at 14:09

## 2025-03-12 RX ADMIN — LEVOTHYROXINE SODIUM 100 MCG: 0.1 TABLET ORAL at 05:28

## 2025-03-12 RX ADMIN — POLYETHYLENE GLYCOL 3350 17 G: 17 POWDER, FOR SOLUTION ORAL at 09:03

## 2025-03-12 SDOH — ECONOMIC STABILITY: HOUSING INSECURITY: AT ANY TIME IN THE PAST 12 MONTHS, WERE YOU HOMELESS OR LIVING IN A SHELTER (INCLUDING NOW)?: NO

## 2025-03-12 SDOH — ECONOMIC STABILITY: INCOME INSECURITY: IN THE PAST 12 MONTHS HAS THE ELECTRIC, GAS, OIL, OR WATER COMPANY THREATENED TO SHUT OFF SERVICES IN YOUR HOME?: NO

## 2025-03-12 SDOH — HEALTH STABILITY: MENTAL HEALTH: HOW OFTEN DO YOU HAVE A DRINK CONTAINING ALCOHOL?: NEVER

## 2025-03-12 SDOH — SOCIAL STABILITY: SOCIAL INSECURITY: ARE YOU MARRIED, WIDOWED, DIVORCED, SEPARATED, NEVER MARRIED, OR LIVING WITH A PARTNER?: MARRIED

## 2025-03-12 SDOH — HEALTH STABILITY: MENTAL HEALTH: HOW OFTEN DO YOU HAVE SIX OR MORE DRINKS ON ONE OCCASION?: NEVER

## 2025-03-12 SDOH — ECONOMIC STABILITY: FOOD INSECURITY: WITHIN THE PAST 12 MONTHS, YOU WORRIED THAT YOUR FOOD WOULD RUN OUT BEFORE YOU GOT THE MONEY TO BUY MORE.: NEVER TRUE

## 2025-03-12 SDOH — ECONOMIC STABILITY: FOOD INSECURITY: WITHIN THE PAST 12 MONTHS, THE FOOD YOU BOUGHT JUST DIDN'T LAST AND YOU DIDN'T HAVE MONEY TO GET MORE.: NEVER TRUE

## 2025-03-12 SDOH — ECONOMIC STABILITY: HOUSING INSECURITY: IN THE PAST 12 MONTHS, HOW MANY TIMES HAVE YOU MOVED WHERE YOU WERE LIVING?: 0

## 2025-03-12 SDOH — ECONOMIC STABILITY: FOOD INSECURITY: HOW HARD IS IT FOR YOU TO PAY FOR THE VERY BASICS LIKE FOOD, HOUSING, MEDICAL CARE, AND HEATING?: NOT HARD AT ALL

## 2025-03-12 SDOH — ECONOMIC STABILITY: TRANSPORTATION INSECURITY: IN THE PAST 12 MONTHS, HAS LACK OF TRANSPORTATION KEPT YOU FROM MEDICAL APPOINTMENTS OR FROM GETTING MEDICATIONS?: NO

## 2025-03-12 SDOH — ECONOMIC STABILITY: HOUSING INSECURITY: IN THE LAST 12 MONTHS, WAS THERE A TIME WHEN YOU WERE NOT ABLE TO PAY THE MORTGAGE OR RENT ON TIME?: NO

## 2025-03-12 SDOH — HEALTH STABILITY: MENTAL HEALTH: HOW MANY DRINKS CONTAINING ALCOHOL DO YOU HAVE ON A TYPICAL DAY WHEN YOU ARE DRINKING?: PATIENT DOES NOT DRINK

## 2025-03-12 ASSESSMENT — COGNITIVE AND FUNCTIONAL STATUS - GENERAL
STANDING UP FROM CHAIR USING ARMS: A LITTLE
DRESSING REGULAR LOWER BODY CLOTHING: A LITTLE
CLIMB 3 TO 5 STEPS WITH RAILING: A LITTLE
WALKING IN HOSPITAL ROOM: A LITTLE
DRESSING REGULAR LOWER BODY CLOTHING: A LITTLE
WALKING IN HOSPITAL ROOM: A LITTLE
MOBILITY SCORE: 24
STANDING UP FROM CHAIR USING ARMS: A LITTLE
MOVING TO AND FROM BED TO CHAIR: A LITTLE
MOVING TO AND FROM BED TO CHAIR: A LITTLE
MOVING FROM LYING ON BACK TO SITTING ON SIDE OF FLAT BED WITH BEDRAILS: A LITTLE
MOVING TO AND FROM BED TO CHAIR: A LITTLE
STANDING UP FROM CHAIR USING ARMS: A LITTLE
TURNING FROM BACK TO SIDE WHILE IN FLAT BAD: A LITTLE
DAILY ACTIVITIY SCORE: 23
CLIMB 3 TO 5 STEPS WITH RAILING: A LITTLE
DAILY ACTIVITIY SCORE: 24
DRESSING REGULAR LOWER BODY CLOTHING: A LITTLE
CLIMB 3 TO 5 STEPS WITH RAILING: A LITTLE
MOBILITY SCORE: 18
DAILY ACTIVITIY SCORE: 23
TURNING FROM BACK TO SIDE WHILE IN FLAT BAD: A LITTLE
MOBILITY SCORE: 18
TURNING FROM BACK TO SIDE WHILE IN FLAT BAD: A LITTLE
MOBILITY SCORE: 18
WALKING IN HOSPITAL ROOM: A LITTLE
DAILY ACTIVITIY SCORE: 23

## 2025-03-12 ASSESSMENT — PAIN SCALES - GENERAL: PAINLEVEL_OUTOF10: 0 - NO PAIN

## 2025-03-12 ASSESSMENT — PAIN - FUNCTIONAL ASSESSMENT: PAIN_FUNCTIONAL_ASSESSMENT: 0-10

## 2025-03-12 ASSESSMENT — ACTIVITIES OF DAILY LIVING (ADL)
LACK_OF_TRANSPORTATION: NO
LACK_OF_TRANSPORTATION: NO

## 2025-03-12 ASSESSMENT — LIFESTYLE VARIABLES
SKIP TO QUESTIONS 9-10: 1
AUDIT-C TOTAL SCORE: 0

## 2025-03-12 NOTE — CARE PLAN
The patient's goals for the shift include      The clinical goals for the shift include Patient remains hemodynamically stable and urine remain light red during shift.      Problem: Safety - Adult  Goal: Free from fall injury  Outcome: Met     Problem: Pain - Adult  Goal: Verbalizes/displays adequate comfort level or baseline comfort level  Outcome: Met

## 2025-03-12 NOTE — CARE PLAN
The patient's goals for the shift include      The clinical goals for the shift include Remain safe, HDS, free of falls, ambulate, urinate independently post catheter removal, and pain free throughout shift      Problem: Pain - Adult  Goal: Verbalizes/displays adequate comfort level or baseline comfort level  Outcome: Progressing     Problem: Safety - Adult  Goal: Free from fall injury  Outcome: Progressing     Problem: Discharge Planning  Goal: Discharge to home or other facility with appropriate resources  Outcome: Progressing     Problem: Chronic Conditions and Co-morbidities  Goal: Patient's chronic conditions and co-morbidity symptoms are monitored and maintained or improved  Outcome: Progressing     Problem: Nutrition  Goal: Nutrient intake appropriate for maintaining nutritional needs  Outcome: Progressing     Problem: Fall/Injury  Goal: Not fall by end of shift  3/12/2025 0940 by Nahed Casas RN  Outcome: Progressing  3/12/2025 0939 by Nahed Casas RN  Outcome: Progressing  Goal: Be free from injury by end of the shift  3/12/2025 0940 by Nahed Casas RN  Outcome: Progressing  3/12/2025 0939 by Nahed Casas RN  Outcome: Progressing  Goal: Verbalize understanding of personal risk factors for fall in the hospital  3/12/2025 0940 by Nahed Casas RN  Outcome: Progressing  3/12/2025 0939 by Nahed Casas RN  Outcome: Progressing  Goal: Verbalize understanding of risk factor reduction measures to prevent injury from fall in the home  3/12/2025 0940 by Nahed Casas RN  Outcome: Progressing  3/12/2025 0939 by Nahed Casas RN  Outcome: Progressing  Goal: Use assistive devices by end of the shift  3/12/2025 0940 by Nahed Casas RN  Outcome: Progressing  3/12/2025 0939 by Nahed Casas RN  Outcome: Progressing  Goal: Pace activities to prevent fatigue by end of the shift  3/12/2025 0940 by Nahed Casas RN  Outcome: Progressing  3/12/2025 0939 by  Nahed Casas RN  Outcome: Progressing

## 2025-03-12 NOTE — PROGRESS NOTES
Pharmacy Medication History     Source of Information: patient    Additional concerns with the patient's PTA list.   N/a  The following updates were made to the Prior to Admission medication list:     Medications ADDED:   N/a  Medications CHANGED:  N/a  Medications REMOVED:   N/a  Medications NOT TAKING:   N/a    Allergy reviewed : Yes    Meds 2 Beds : No    Outpatient pharmacy confirmed and updated in chart : Yes    Pharmacy name: [facility]    The list below reflectives the updated PTA list. Please review each medication in order reconciliation for additional clarification and justification.    Prior to Admission Medications   Prescriptions Last Dose   Synthroid 100 mcg tablet 3/11/2025 Morning   Sig: TAKE 1 TABLET ONCE DAILY ASDIRECTED   allopurinol (Zyloprim) 100 mg tablet 3/11/2025 Morning   Sig: Take 2 tablets (200 mg) by mouth once daily.   aspirin 81 mg chewable tablet 3/11/2025 Morning   Sig: Chew 1 tablet (81 mg) once daily.   atorvastatin (Lipitor) 80 mg tablet 3/10/2025   Sig: TAKE 1 TABLET DAILY   calcitriol (Rocaltrol) 0.25 mcg capsule Past Week   Sig: Take 1 capsule (0.25 mcg) by mouth 3 (three) times a week. Mon, Wed, Fri   doxazosin (Cardura) 1 mg tablet 3/11/2025 Morning   Sig: Take 1 tablet (1 mg) by mouth early in the morning..   metoprolol tartrate (Lopressor) 25 mg tablet 3/11/2025 Morning   Sig: Take 1 tablet (25 mg) by mouth 2 times a day.   nitroglycerin (Nitrostat) 0.4 mg SL tablet Unknown   Sig: Place 1 tablet (0.4 mg) under the tongue every 5 minutes if needed for chest pain.   Patient not taking: Reported on 3/3/2025   tamsulosin (Flomax) 0.4 mg 24 hr capsule 3/11/2025 Morning   Sig: Take 1 capsule (0.4 mg) by mouth once daily.   vit C,F-Ho-qqval-lutein-zeaxan (PreserVision AREDS-2) 250-90-40-1 mg tablet,chewable Past Week   Sig: Chew 1 tablet 2 times a day.      Facility-Administered Medications: None       The list below reflectives the updated allergy list. Please review each  documented allergy for additional clarification and justification.    Allergies   Allergen Reactions    Sulfa (Sulfonamide Antibiotics) Headache     severe headaches          03/12/25 at 1:12 PM - Ricci Bernal

## 2025-03-12 NOTE — DISCHARGE SUMMARY
Discharge Diagnosis  Obstructive uropathy    Issues Requiring Follow-Up  S/P Anatomical Endoscopic Prostate Enucleation ~ HoLEP    Test Results Pending At Discharge  Pending Labs       Order Current Status    Surgical Pathology Exam In process            Hospital Course   70 yr old male s/p Anatomical Endoscopic Prostate Enucleation ~ HoLEP on 3/11 with Dr. Carpenter. Please see operative report for full details. Patient tolerated the procedure well and recovered briefly in PACU before being transitioned to regular nursing floor. Post-op course was uncomplicated. Diet was advanced as tolerated.  IV medication transitioned to oral as diet advanced. On the day of discharge, the pt was tolerating a diet, pain was controlled on PO pain medication, and they were ambulating and voiding spontaneously. Pt discharged home on 3/12 in stable condition with instructions to follow up as outpatient.     Pertinent Physical Exam At Time of Discharge  Constitutional: A&Ox3, calm and cooperative, NAD  Eyes: EOMI, clear sclera   Cardiovascular: Normal rate and regular rhythm. No murmurs  Respiratory/Thorax: CTAB, on RA  Gastrointestinal: Abdomen soft, nontender, nondistended, +BS  Genitourinary: Voiding independently- light pink   Musculoskeletal: ROM intact  Extremities: No peripheral edema  Neurological: A&Ox3, No focal deficits   Psychological: Appropriate mood and behavior    Home Medications     Medication List      START taking these medications     acetaminophen 325 mg tablet; Commonly known as: Tylenol; Take 2 tablets   (650 mg) by mouth every 6 hours if needed for mild pain (1 - 3).   amoxicillin 500 mg capsule; Commonly known as: Amoxil; Take 1 capsule   (500 mg) by mouth 3 times a day for 5 days.   ibuprofen 600 mg tablet; Take 1 tablet (600 mg) by mouth every 6 hours   if needed for mild pain (1 - 3).   oxyCODONE 5 mg immediate release tablet; Commonly known as: Roxicodone;   Take 1 tablet (5 mg) by mouth every 6 hours if  needed for severe pain (7 -   10).   phenazopyridine 200 mg tablet; Commonly known as: Pyridium; Take 1   tablet (200 mg) by mouth 3 times a day as needed for bladder spasms.   polyethylene glycol 17 gram packet; Commonly known as: Glycolax,   Miralax; Take 17 g by mouth once daily.     CONTINUE taking these medications     allopurinol 100 mg tablet; Commonly known as: Zyloprim   aspirin 81 mg chewable tablet   atorvastatin 80 mg tablet; Commonly known as: Lipitor; TAKE 1 TABLET   DAILY   calcitriol 0.25 mcg capsule; Commonly known as: Rocaltrol   doxazosin 1 mg tablet; Commonly known as: Cardura   metoprolol tartrate 25 mg tablet; Commonly known as: Lopressor; Take 1   tablet (25 mg) by mouth 2 times a day.   PreserVision AREDS-2 250-90-40-1 mg tablet,chewable; Generic drug: vit   C,W-Pm-deede-lutein-zeaxan   Synthroid 100 mcg tablet; Generic drug: levothyroxine; TAKE 1 TABLET   ONCE DAILY ASDIRECTED     STOP taking these medications     tamsulosin 0.4 mg 24 hr capsule; Commonly known as: Flomax     ASK your doctor about these medications     nitroglycerin 0.4 mg SL tablet; Commonly known as: Nitrostat; Place 1   tablet (0.4 mg) under the tongue every 5 minutes if needed for chest pain.       Outpatient Follow-Up  Future Appointments   Date Time Provider Department Center   3/13/2025  9:30 AM Silviano Lindsey MD AL[facility]   3/26/2025  9:40 [facility]   5/8/2025 10:45 AM Nathan Gilmore DO [id number] [facility]       LUCY Crowell-CNP

## 2025-03-12 NOTE — PROGRESS NOTES
03/12/25 1049   Discharge Planning   Living Arrangements Spouse/significant other   Support Systems Spouse/significant other   Assistance Needed none   Type of Residence Private residence   Number of Stairs to Enter Residence 4   Number of Stairs Within Residence 15   Do you have animals or pets at home? No   Who is requesting discharge planning? Provider   Home or Post Acute Services None   Expected Discharge Disposition Home   Does the patient need discharge transport arranged? No   Financial Resource Strain   How hard is it for you to pay for the very basics like food, housing, medical care, and heating? Not hard   Housing Stability   In the last 12 months, was there a time when you were not able to pay the mortgage or rent on time? N   In the past 12 months, how many times have you moved where you were living? 0   At any time in the past 12 months, were you homeless or living in a shelter (including now)? N   Transportation Needs   In the past 12 months, has lack of transportation kept you from medical appointments or from getting medications? no   In the past 12 months, has lack of transportation kept you from meetings, work, or from getting things needed for daily living? No   Stroke Family Assessment   Stroke Family Assessment Needed No   Intensity of Service   Intensity of Service 0-30 min     3/12/25 1049  Met with patient at bedside to discuss discharge planning.  Patient lives at home with his wife in a house.  He is independent with ADLs and drives at baseline.  He does not use any assistive devices for ambulation. He plans on going home at discharge.  He denies any HHC, DME, or discharge needs.  He will notify the [facility] should any needs arise.  ADOD today.  Daniella Hidalgo RN [facility]

## 2025-03-13 ENCOUNTER — APPOINTMENT (OUTPATIENT)
Dept: UROLOGY | Facility: HOSPITAL | Age: 70
End: 2025-03-13
Payer: MEDICARE

## 2025-03-26 ENCOUNTER — APPOINTMENT (OUTPATIENT)
Dept: UROLOGY | Facility: HOSPITAL | Age: 70
End: 2025-03-26
Payer: MEDICARE

## 2025-04-02 NOTE — PROGRESS NOTES
HPI    70 y.o. male being seen with the following problem list:    Problem list:  Urinary retention. Now s/p HoLEP 3/11/25 Path ASAP 34g     Pt with h/o urinary retention.  Was in ER back in Oct/24, golden placed for 1400cc.  Had seen Dr Arguello in the past. Was referred to Dr Titus.  CT scan showed enlarged prostate (approx 90g), thickened bladder wall, mild hydro.  Had TURP with Dr Titus on 12/20/24 (11g benign tissue).  Has failed TOV after x2.  Hasn't  been able to self cath.       02/12/25 - Here for a cysto to evaluate his channel. Cysto shows modest two part channel near the bladder neck, obstructing tissue.      03/05/25 - Here today for cath change. Surgery on 3/11/25    04/03/25 - PVR 107cc. Stream is strong, no leakage. Irritative symptoms are improving. Thrilled with his outcome      Current Medications:  Current Outpatient Medications   Medication Sig Dispense Refill    acetaminophen (Tylenol) 325 mg tablet Take 2 tablets (650 mg) by mouth every 6 hours if needed for mild pain (1 - 3). 30 tablet 0    allopurinol (Zyloprim) 100 mg tablet Take 2 tablets (200 mg) by mouth once daily.      aspirin 81 mg chewable tablet Chew 1 tablet (81 mg) once daily.      atorvastatin (Lipitor) 80 mg tablet TAKE 1 TABLET DAILY 90 tablet 3    calcitriol (Rocaltrol) 0.25 mcg capsule Take 1 capsule (0.25 mcg) by mouth 3 (three) times a week. Mon, Wed, Fri      doxazosin (Cardura) 1 mg tablet Take 1 tablet (1 mg) by mouth early in the morning..      ibuprofen 600 mg tablet Take 1 tablet (600 mg) by mouth every 6 hours if needed for mild pain (1 - 3). 30 tablet 0    metoprolol tartrate (Lopressor) 25 mg tablet Take 1 tablet (25 mg) by mouth 2 times a day. 180 tablet 3    nitroglycerin (Nitrostat) 0.4 mg SL tablet Place 1 tablet (0.4 mg) under the tongue every 5 minutes if needed for chest pain. (Patient not taking: Reported on 3/3/2025) 25 tablet 3    oxyCODONE (Roxicodone) 5 mg immediate release tablet Take 1 tablet (5 mg) by  mouth every 6 hours if needed for severe pain (7 - 10). 5 tablet 0    phenazopyridine (Pyridium) 200 mg tablet Take 1 tablet (200 mg) by mouth 3 times a day as needed for bladder spasms. 10 tablet 0    polyethylene glycol (Glycolax, Miralax) 17 gram packet Take 17 g by mouth once daily. 10 packet 0    Synthroid 100 mcg tablet TAKE 1 TABLET ONCE DAILY ASDIRECTED 90 tablet 0    vit C,E-Bg-jvzhj-lutein-zeaxan (PreserVision AREDS-2) 250-90-40-1 mg tablet,chewable Chew 1 tablet 2 times a day.       No current facility-administered medications for this visit.        Active Problems:  Trent Sandhu is a 70 y.o. male with the following Problems and Medications.  Patient Active Problem List   Diagnosis    ASHD (arteriosclerotic heart disease)    HTN (hypertension)    Hyperlipemia    Preop cardiovascular exam    Retention of urine    Benign prostatic hyperplasia with urinary retention    Obstructive uropathy    Cox catheter in place    BPH with obstruction/lower urinary tract symptoms     Current Outpatient Medications   Medication Sig Dispense Refill    acetaminophen (Tylenol) 325 mg tablet Take 2 tablets (650 mg) by mouth every 6 hours if needed for mild pain (1 - 3). 30 tablet 0    allopurinol (Zyloprim) 100 mg tablet Take 2 tablets (200 mg) by mouth once daily.      aspirin 81 mg chewable tablet Chew 1 tablet (81 mg) once daily.      atorvastatin (Lipitor) 80 mg tablet TAKE 1 TABLET DAILY 90 tablet 3    calcitriol (Rocaltrol) 0.25 mcg capsule Take 1 capsule (0.25 mcg) by mouth 3 (three) times a week. Mon, Wed, Fri      doxazosin (Cardura) 1 mg tablet Take 1 tablet (1 mg) by mouth early in the morning..      ibuprofen 600 mg tablet Take 1 tablet (600 mg) by mouth every 6 hours if needed for mild pain (1 - 3). 30 tablet 0    metoprolol tartrate (Lopressor) 25 mg tablet Take 1 tablet (25 mg) by mouth 2 times a day. 180 tablet 3    nitroglycerin (Nitrostat) 0.4 mg SL tablet Place 1 tablet (0.4 mg) under the tongue every 5  "minutes if needed for chest pain. (Patient not taking: Reported on 3/3/2025) 25 tablet 3    oxyCODONE (Roxicodone) 5 mg immediate release tablet Take 1 tablet (5 mg) by mouth every 6 hours if needed for severe pain (7 - 10). 5 tablet 0    phenazopyridine (Pyridium) 200 mg tablet Take 1 tablet (200 mg) by mouth 3 times a day as needed for bladder spasms. 10 tablet 0    polyethylene glycol (Glycolax, Miralax) 17 gram packet Take 17 g by mouth once daily. 10 packet 0    Synthroid 100 mcg tablet TAKE 1 TABLET ONCE DAILY ASDIRECTED 90 tablet 0    vit C,X-Ap-hojzf-lutein-zeaxan (PreserVision AREDS-2) 250-90-40-1 mg tablet,chewable Chew 1 tablet 2 times a day.       No current facility-administered medications for this visit.       PMH:  Past Medical History:   Diagnosis Date    ASHD (arteriosclerotic heart disease)     drug-eluting stent placed into the circumflex artery back in May 2011.    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 Gilmore \"Stable from a cardiac standpoint for TURP 12/2024       No CP/anginal symptoms. No episodes of heart failure. Good functional capacity. Normal twelve-lead EKG. He may proceed with the prostate surgery at acceptable low cardiovascular risk.  He will likely need to stop his aspirin 1 week prior to the procedure & then restart postoperatively when it is felt bleeding this is acceptable.\"    H/O echocardiogram 09/11/2024    CONCLUSIONS:   1. Left ventricular ejection fraction is normal, by visual estimate at 55-60%.   2. Spectral Doppler shows an impaired relaxation pattern of left ventricular diastolic filling.   3. There is normal right " ventricular global systolic function.   4. Right ventricular within normal limits.    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       PSH:  Past Surgical History:   Procedure Laterality Date    CORONARY ANGIOPLASTY  2011    EYE SURGERY Left 12/17/1997    HERNIA REPAIR  2022    TRANSURETHRAL RESECTION OF PROSTATE  12/20/2024       FMH:  Family History   Problem Relation Name Age of Onset    Heart attack Mother Madina Sandhu     Alzheimer's disease Mother Madina Sawchik     Heart disease Mother Madina Shahidchiky     Cancer Mother Madina Sandhu     Hypertension Mother Madina Sandhu     Emphysema Father Narendra Shahidchiky     Lung disease Father Narendra Shahidchiky     Heart disease Maternal Grandmother Yazmin Bird        SHx:  Social History     Tobacco Use    Smoking status: Never    Smokeless tobacco: Never   Vaping Use    Vaping status: Never Used   Substance Use Topics    Alcohol use: Not Currently    Drug use: Never       Allergies:  Allergies   Allergen Reactions    Sulfa (Sulfonamide Antibiotics) Headache     severe headaches       Assessment/Plan  About 3 weeks s/p HoLEP. Emptying adequately without a cath, stream is strong. No leakage. Irritative symptoms improving overtime. Overall thrilled to be cath free.     Reviewed ASAP pathology, discussed that this is not a cancer but classically is associated with increased risk of prostate cancer. I recommend re-checking PSA in 3 months just to keep an eye on things.     FU in 3 months with PSA prior.    Scribe Attestation  By signing my name below, I, Anthony Montgomery, attest that this documentation has been prepared under the direction and in the presence of Collin Carpenter MD.

## 2025-04-03 ENCOUNTER — OFFICE VISIT (OUTPATIENT)
Dept: UROLOGY | Facility: HOSPITAL | Age: 70
End: 2025-04-03
Payer: MEDICARE

## 2025-04-03 DIAGNOSIS — R33.8 BENIGN PROSTATIC HYPERPLASIA WITH URINARY RETENTION: Primary | ICD-10-CM

## 2025-04-03 DIAGNOSIS — R89.7 ABNORMAL PATHOLOGY REPORT FROM PROSTATE NEEDLE BIOPSY: ICD-10-CM

## 2025-04-03 DIAGNOSIS — N40.1 BENIGN PROSTATIC HYPERPLASIA WITH URINARY RETENTION: Primary | ICD-10-CM

## 2025-04-03 PROCEDURE — 99214 OFFICE O/P EST MOD 30 MIN: CPT | Mod: 24 | Performed by: UROLOGY

## 2025-04-03 PROCEDURE — G2211 COMPLEX E/M VISIT ADD ON: HCPCS | Performed by: UROLOGY

## 2025-04-03 PROCEDURE — 1157F ADVNC CARE PLAN IN RCRD: CPT | Performed by: UROLOGY

## 2025-04-03 PROCEDURE — 1036F TOBACCO NON-USER: CPT | Performed by: UROLOGY

## 2025-04-03 PROCEDURE — 99214 OFFICE O/P EST MOD 30 MIN: CPT | Performed by: UROLOGY

## 2025-04-03 PROCEDURE — 1159F MED LIST DOCD IN RCRD: CPT | Performed by: UROLOGY

## 2025-04-03 PROCEDURE — 1111F DSCHRG MED/CURRENT MED MERGE: CPT | Performed by: UROLOGY

## 2025-04-12 DIAGNOSIS — Z00.00 HEALTH CARE MAINTENANCE: ICD-10-CM

## 2025-04-17 RX ORDER — LEVOTHYROXINE SODIUM 100 UG/1
100 TABLET ORAL DAILY
Qty: 90 TABLET | Refills: 0 | Status: SHIPPED | OUTPATIENT
Start: 2025-04-17

## 2025-05-01 LAB
CHOLEST SERPL-MCNC: 112 MG/DL
CHOLEST/HDLC SERPL: 2.5 (CALC)
HDLC SERPL-MCNC: 45 MG/DL
LDLC SERPL CALC-MCNC: 51 MG/DL (CALC)
NONHDLC SERPL-MCNC: 67 MG/DL (CALC)
TRIGL SERPL-MCNC: 77 MG/DL

## 2025-05-07 NOTE — ASSESSMENT & PLAN NOTE
Reviewed lipid panel: Total cholesterol 112, triglycerides 77, HDL 45 and LDL 51.  Good reduction in the LDL cholesterol with the atorvastatin at 80 mg daily.

## 2025-05-08 ENCOUNTER — APPOINTMENT (OUTPATIENT)
Facility: CLINIC | Age: 70
End: 2025-05-08
Payer: MEDICARE

## 2025-05-15 ENCOUNTER — OFFICE VISIT (OUTPATIENT)
Facility: CLINIC | Age: 70
End: 2025-05-15
Payer: MEDICARE

## 2025-05-15 VITALS
SYSTOLIC BLOOD PRESSURE: 116 MMHG | DIASTOLIC BLOOD PRESSURE: 70 MMHG | OXYGEN SATURATION: 100 % | HEART RATE: 62 BPM | WEIGHT: 161 LBS | BODY MASS INDEX: 23.78 KG/M2

## 2025-05-15 DIAGNOSIS — I25.10 ASHD (ARTERIOSCLEROTIC HEART DISEASE): Primary | ICD-10-CM

## 2025-05-15 DIAGNOSIS — N18.32 CHRONIC KIDNEY DISEASE, STAGE 3B (MULTI): ICD-10-CM

## 2025-05-15 DIAGNOSIS — I10 PRIMARY HYPERTENSION: ICD-10-CM

## 2025-05-15 DIAGNOSIS — E78.2 MIXED HYPERLIPIDEMIA: ICD-10-CM

## 2025-05-15 PROCEDURE — 99213 OFFICE O/P EST LOW 20 MIN: CPT | Performed by: INTERNAL MEDICINE

## 2025-05-15 PROCEDURE — 3078F DIAST BP <80 MM HG: CPT | Performed by: INTERNAL MEDICINE

## 2025-05-15 PROCEDURE — 3074F SYST BP LT 130 MM HG: CPT | Performed by: INTERNAL MEDICINE

## 2025-05-15 PROCEDURE — 1036F TOBACCO NON-USER: CPT | Performed by: INTERNAL MEDICINE

## 2025-05-15 PROCEDURE — 1159F MED LIST DOCD IN RCRD: CPT | Performed by: INTERNAL MEDICINE

## 2025-05-15 PROCEDURE — 1126F AMNT PAIN NOTED NONE PRSNT: CPT | Performed by: INTERNAL MEDICINE

## 2025-05-15 ASSESSMENT — ENCOUNTER SYMPTOMS
COUGH: 0
DYSPNEA ON EXERTION: 0
PARESTHESIAS: 0
BLURRED VISION: 0
ABDOMINAL PAIN: 0
PALPITATIONS: 0
NUMBNESS: 0
DEPRESSION: 0
HEMATURIA: 0
SHORTNESS OF BREATH: 0
OCCASIONAL FEELINGS OF UNSTEADINESS: 0
LOSS OF SENSATION IN FEET: 0
DYSURIA: 0

## 2025-05-15 ASSESSMENT — PATIENT HEALTH QUESTIONNAIRE - PHQ9
SUM OF ALL RESPONSES TO PHQ9 QUESTIONS 1 AND 2: 0
1. LITTLE INTEREST OR PLEASURE IN DOING THINGS: NOT AT ALL
2. FEELING DOWN, DEPRESSED OR HOPELESS: NOT AT ALL

## 2025-05-15 ASSESSMENT — PAIN SCALES - GENERAL: PAINLEVEL_OUTOF10: 0-NO PAIN

## 2025-05-15 ASSESSMENT — LIFESTYLE VARIABLES: TOTAL SCORE: 0

## 2025-05-15 NOTE — PROGRESS NOTES
Subjective   Trent Sandhu is a 70 y.o. male.    Chief Complaint:  6 MONTH FOLLOW UP    HPI  70-year-old male with a history of coronary artery disease reports for cardiology follow-up visit.  Cardiac wise patient doing well with no chest pains or angina.  He had a percutaneous coronary invention with a drug-eluting stent placed into his circumflex artery back in May 2011 and since then has done very well from a cardiac standpoint.  Unfortunately developed prostatic hypertrophy and underwent a surgical procedure with good result.  Urinary catheter was removed and he is able to urinate without obstruction.  Overall feels quite well.  States he is walking almost 3 miles a day.    Review of Systems   Constitutional: Negative for malaise/fatigue.   HENT:  Negative for congestion.    Eyes:  Negative for blurred vision.   Cardiovascular:  Negative for chest pain, dyspnea on exertion and palpitations.   Respiratory:  Negative for cough and shortness of breath.    Musculoskeletal:  Negative for joint pain.   Gastrointestinal:  Negative for abdominal pain.   Genitourinary:  Negative for dysuria and hematuria.   Neurological:  Negative for numbness and paresthesias.       Objective   Constitutional:       Appearance: Not in distress.   Eyes:      Conjunctiva/sclera: Conjunctivae normal.   Neck:      Vascular: JVD normal.   Pulmonary:      Breath sounds: Normal breath sounds. No wheezing. No rhonchi. No rales.   Cardiovascular:      Normal rate. Regular rhythm.      Murmurs: There is no murmur.      No gallop.  No click. No rub.   Abdominal:      Palpations: Abdomen is soft.   Neurological:      General: No focal deficit present.      Mental Status: Alert.         Lab Review:       Assessment/Plan   The primary encounter diagnosis was ASHD (arteriosclerotic heart disease). Diagnoses of Primary hypertension and Mixed hyperlipidemia were also pertinent to this visit.    Hyperlipemia  Reviewed lipid panel: Total cholesterol 112,  triglycerides 77, HDL 45 and LDL 51.  Good reduction in the LDL cholesterol with the atorvastatin at 80 mg daily.    HTN (hypertension)  Blood pressure very well-controlled, no change necessary.    ASHD (arteriosclerotic heart disease)  Stable coronary artery disease.  Patient underwent percutaneous coronary intervention back in May 2011 with drug-eluting stent placed into the circumflex artery.  Currently experiences no chest pain or anginal symptoms.  We will continue standard risk factor modification and follow on a clinical basis.

## 2025-05-15 NOTE — ASSESSMENT & PLAN NOTE
Stable coronary artery disease.  Patient underwent percutaneous coronary intervention back in May 2011 with drug-eluting stent placed into the circumflex artery.  Currently experiences no chest pain or anginal symptoms.  We will continue standard risk factor modification and follow on a clinical basis.

## 2025-06-10 ENCOUNTER — APPOINTMENT (OUTPATIENT)
Dept: PRIMARY CARE | Facility: CLINIC | Age: 70
End: 2025-06-10
Payer: MEDICARE

## 2025-06-10 DIAGNOSIS — Z00.00 HEALTH CARE MAINTENANCE: Primary | ICD-10-CM

## 2025-06-10 DIAGNOSIS — I10 PRIMARY HYPERTENSION: ICD-10-CM

## 2025-06-10 DIAGNOSIS — E03.9 HYPOTHYROIDISM, UNSPECIFIED TYPE: ICD-10-CM

## 2025-06-10 PROCEDURE — 1160F RVW MEDS BY RX/DR IN RCRD: CPT | Performed by: INTERNAL MEDICINE

## 2025-06-10 PROCEDURE — 1159F MED LIST DOCD IN RCRD: CPT | Performed by: INTERNAL MEDICINE

## 2025-06-10 PROCEDURE — 99213 OFFICE O/P EST LOW 20 MIN: CPT | Performed by: INTERNAL MEDICINE

## 2025-06-10 PROCEDURE — 1036F TOBACCO NON-USER: CPT | Performed by: INTERNAL MEDICINE

## 2025-06-10 PROCEDURE — G0439 PPPS, SUBSEQ VISIT: HCPCS | Performed by: INTERNAL MEDICINE

## 2025-06-10 PROCEDURE — 1170F FXNL STATUS ASSESSED: CPT | Performed by: INTERNAL MEDICINE

## 2025-06-10 PROCEDURE — 99397 PER PM REEVAL EST PAT 65+ YR: CPT | Performed by: INTERNAL MEDICINE

## 2025-06-10 ASSESSMENT — ENCOUNTER SYMPTOMS
OCCASIONAL FEELINGS OF UNSTEADINESS: 0
LOSS OF SENSATION IN FEET: 0
DEPRESSION: 0

## 2025-06-10 ASSESSMENT — ACTIVITIES OF DAILY LIVING (ADL)
MANAGING_FINANCES: INDEPENDENT
DOING_HOUSEWORK: INDEPENDENT
DRESSING: INDEPENDENT
TAKING_MEDICATION: INDEPENDENT
BATHING: INDEPENDENT
GROCERY_SHOPPING: INDEPENDENT

## 2025-06-10 ASSESSMENT — PATIENT HEALTH QUESTIONNAIRE - PHQ9
1. LITTLE INTEREST OR PLEASURE IN DOING THINGS: NOT AT ALL
2. FEELING DOWN, DEPRESSED OR HOPELESS: NOT AT ALL
1. LITTLE INTEREST OR PLEASURE IN DOING THINGS: NOT AT ALL
SUM OF ALL RESPONSES TO PHQ9 QUESTIONS 1 AND 2: 0
2. FEELING DOWN, DEPRESSED OR HOPELESS: NOT AT ALL
SUM OF ALL RESPONSES TO PHQ9 QUESTIONS 1 AND 2: 0

## 2025-06-22 ASSESSMENT — ACTIVITIES OF DAILY LIVING (ADL)
GROCERY_SHOPPING: INDEPENDENT
DOING_HOUSEWORK: INDEPENDENT
MANAGING_FINANCES: INDEPENDENT
DRESSING: INDEPENDENT
TAKING_MEDICATION: INDEPENDENT
BATHING: INDEPENDENT

## 2025-06-23 NOTE — PROGRESS NOTES
Subjective   Patient ID: Trent Sandhu is a 70 y.o. male who presents for Medicare Annual Wellness Visit Subsequent.    HPI  Cpe sufs overall doing well  No chest pain no shortness of breath no side effect with medication follow-up with cardiology bowels normal no dysuria    Past medical history ASCVD BPH renal insufficiency hypertension hyperlipidemia    Medications noted and unchanged including atorvastatin metoprolol levothyroxine    Allergies sulfa noted and unchanged see EMR    Social history no tobacco    Family history noted and unchanged    Prevention some exercise some prior blood work reviewed and discussed with urology follow-up as well as colonoscopy    Depression screen not depressed  Review of Systems    Objective vital signs noted weight 157 pounds blood pressure 121/71 alert and oriented x 3 NCAT nares without discharge OP benign TM normal bilateral EAC clear bilateral no AC nodes no thyromegaly chest clear to auscultation CV regular rate and rhythm S1-S2 abdomen soft nontender normal active bowel sounds LS spine normal curvature negative straight leg raise extremities no clubbing cyanosis or edema normal distal pulses DTR 2+  There were no vitals taken for this visit.    Physical Exam   Assessment/Plan  impression General Medical examination hypertension hyperlipidemia hypothyroidism htn other diagnosis gout with hyperuricemia   Plan per blood work reviewed with him and may add additional blood work as needed follow-up with cardiology follow-up with urology as needed good diet regular exercise good water consumption continue with blood pressure management and medication reviewed prior lipids and noted statin and review BPH medication obstructive uropathy and prior creatinine levels continue with levothyroxine and advised on next blood work and follow-up screening may consider recheck uric acid continue with allopurinol as needed then recheck 3 months based on above and any other blood work at that  time TT 50 cc 26

## 2025-07-03 ENCOUNTER — TELEPHONE (OUTPATIENT)
Dept: PRIMARY CARE | Facility: CLINIC | Age: 70
End: 2025-07-03
Payer: MEDICARE

## 2025-07-03 DIAGNOSIS — Z00.00 HEALTH CARE MAINTENANCE: Primary | ICD-10-CM

## 2025-07-03 RX ORDER — AZITHROMYCIN 250 MG/1
TABLET, FILM COATED ORAL
Qty: 6 TABLET | Refills: 0 | Status: SHIPPED | OUTPATIENT
Start: 2025-07-03 | End: 2025-07-08

## 2025-07-09 NOTE — PROGRESS NOTES
HPI    70 y.o. male being seen with the following problem list:    Problem list:  Urinary retention. Now s/p HoLEP 3/11/25 Path ASAP 34g     Pt with h/o urinary retention.  Was in ER back in Oct/24, golden placed for 1400cc.  Had seen Dr Arguello in the past. Was referred to Dr Titus.  CT scan showed enlarged prostate (approx 90g), thickened bladder wall, mild hydro.  Had TURP with Dr Titus on 12/20/24 (11g benign tissue).  Has failed TOV after x2.  Hasn't  been able to self cath.       02/12/25 - Here for a cysto to evaluate his channel. Cysto shows modest two part channel near the bladder neck, obstructing tissue.      03/05/25 - Here today for cath change. Surgery on 3/11/25     04/03/25 - PVR 107cc. Stream is strong, no leakage. Irritative symptoms are improving. Thrilled with his outcome    3/2025 Cr 1.6    07/10/25 - PVR 147cc. PSA not completed. Stream is strong. Frequency 6-8x a day but drinks plenty of fluids for his kidneys. Overall very happy       Current Medications:  Current Medications[1]     Active Problems:  Trent Sandhu is a 70 y.o. male with the following Problems and Medications.  Problem List[2]  Current Medications[3]    PMH:  Medical History[4]    PSH:  Surgical History[5]    FMH:  Family History[6]    SHx:  Social History[7]    Allergies:  RX Allergies[8]      Assessment/Plan  About 4 months s/p HoLEP, emptying better without a cath, stream is strong. Some frequency, discussed modifying fluid intake. Discussed double void to empty better when needed. Overall happy with his outcome. Did not complete PSA, advise to get this done.     Cr was 1.6 in 5/2025, will repeat BMP in a month or so to assess his kidney function.    FU in 1-2 month with PSA and BMP prior.     Scribe Attestation  By signing my name below, I, Anthony Montgomery, attest that this documentation has been prepared under the direction and in the presence of Collin Carpenter MD.          [1]   Current Outpatient Medications    Medication Sig Dispense Refill    allopurinol (Zyloprim) 100 mg tablet Take 2 tablets (200 mg) by mouth once daily.      aspirin 81 mg chewable tablet Chew 1 tablet (81 mg) once daily.      atorvastatin (Lipitor) 80 mg tablet TAKE 1 TABLET DAILY 90 tablet 3    calcitriol (Rocaltrol) 0.25 mcg capsule Take 1 capsule (0.25 mcg) by mouth 3 (three) times a week. Mon, Wed, Fri      doxazosin (Cardura) 1 mg tablet Take 1 tablet (1 mg) by mouth early in the morning..      metoprolol tartrate (Lopressor) 25 mg tablet Take 1 tablet (25 mg) by mouth 2 times a day. 180 tablet 3    nitroglycerin (Nitrostat) 0.4 mg SL tablet Place 1 tablet (0.4 mg) under the tongue every 5 minutes if needed for chest pain. 25 tablet 3    Synthroid 100 mcg tablet TAKE 1 TABLET ONCE DAILY ASDIRECTED 90 tablet 0    vit C,W-Cg-xlptg-lutein-zeaxan (PreserVision AREDS-2) 250-90-40-1 mg tablet,chewable Chew 1 tablet 2 times a day.       No current facility-administered medications for this visit.   [2]   Patient Active Problem List  Diagnosis    ASHD (arteriosclerotic heart disease)    HTN (hypertension)    Hyperlipemia    Preop cardiovascular exam    Retention of urine    Benign prostatic hyperplasia with urinary retention    Obstructive uropathy    Cox catheter in place    BPH with obstruction/lower urinary tract symptoms    Abnormal pathology report from prostate needle biopsy    Chronic kidney disease, stage 3b (Multi)   [3]   Current Outpatient Medications   Medication Sig Dispense Refill    allopurinol (Zyloprim) 100 mg tablet Take 2 tablets (200 mg) by mouth once daily.      aspirin 81 mg chewable tablet Chew 1 tablet (81 mg) once daily.      atorvastatin (Lipitor) 80 mg tablet TAKE 1 TABLET DAILY 90 tablet 3    calcitriol (Rocaltrol) 0.25 mcg capsule Take 1 capsule (0.25 mcg) by mouth 3 (three) times a week. Mon, Wed, Fri      doxazosin (Cardura) 1 mg tablet Take 1 tablet (1 mg) by mouth early in the morning..      metoprolol tartrate  "(Lopressor) 25 mg tablet Take 1 tablet (25 mg) by mouth 2 times a day. 180 tablet 3    nitroglycerin (Nitrostat) 0.4 mg SL tablet Place 1 tablet (0.4 mg) under the tongue every 5 minutes if needed for chest pain. 25 tablet 3    Synthroid 100 mcg tablet TAKE 1 TABLET ONCE DAILY ASDIRECTED 90 tablet 0    vit C,K-Iq-lbvyk-lutein-zeaxan (PreserVision AREDS-2) 250-90-40-1 mg tablet,chewable Chew 1 tablet 2 times a day.       No current facility-administered medications for this visit.   [4]   Past Medical History:  Diagnosis Date    ASHD (arteriosclerotic heart disease)     drug-eluting stent placed into the circumflex artery back in May 2011.    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 Gilmore \"Stable from a cardiac standpoint for TURP 12/2024       No CP/anginal symptoms. No episodes of heart failure. Good functional capacity. Normal twelve-lead EKG. He may proceed with the prostate surgery at acceptable low cardiovascular risk.  He will likely need to stop his aspirin 1 week prior to the procedure & then restart postoperatively when it is felt bleeding this is acceptable.\"    H/O echocardiogram 09/11/2024    CONCLUSIONS:   1. Left ventricular ejection fraction is normal, by visual estimate at 55-60%.   2. Spectral Doppler shows an impaired relaxation pattern of left ventricular diastolic filling.   3. There is normal right ventricular global systolic function.   4. Right ventricular within normal limits.    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   [5]   Past Surgical History:  Procedure Laterality Date    CORONARY ANGIOPLASTY  2011    CORONARY STENT PLACEMENT  2011    EYE SURGERY Left 12/17/1997    HERNIA REPAIR  2022    PROSTATE SURGERY      PROSTATE SURGERY  03/11/2025    TRANSURETHRAL RESECTION OF PROSTATE  12/20/2024   [6]   Family History  Problem Relation Name Age of Onset    Heart attack Mother Yazmin Bird     Alzheimer's disease Mother Yazmin Bird     Heart disease Mother Yazmin Bird     Cancer Mother Yazmin Bird     Hypertension Mother Yazmin Bird     Emphysema Father Narendra Sawchik     Lung disease Father Narendra Sawchik     Heart disease Maternal Grandmother Yazmin Bird    [7]   Social History  Tobacco Use    Smoking status: Never    Smokeless tobacco: Never   Vaping Use    Vaping status: Never Used   Substance Use Topics    Alcohol use: Not Currently    Drug use: Never   [8]   Allergies  Allergen Reactions    Sulfa (Sulfonamide Antibiotics) Headache     severe headaches

## 2025-07-10 ENCOUNTER — OFFICE VISIT (OUTPATIENT)
Dept: UROLOGY | Facility: HOSPITAL | Age: 70
End: 2025-07-10
Payer: MEDICARE

## 2025-07-10 DIAGNOSIS — N18.31 STAGE 3A CHRONIC KIDNEY DISEASE (MULTI): ICD-10-CM

## 2025-07-10 DIAGNOSIS — N13.9 OBSTRUCTIVE UROPATHY: Primary | ICD-10-CM

## 2025-07-10 DIAGNOSIS — N40.1 BENIGN PROSTATIC HYPERPLASIA WITH URINARY RETENTION: ICD-10-CM

## 2025-07-10 DIAGNOSIS — R33.8 BENIGN PROSTATIC HYPERPLASIA WITH URINARY RETENTION: ICD-10-CM

## 2025-07-10 PROCEDURE — 99214 OFFICE O/P EST MOD 30 MIN: CPT | Performed by: UROLOGY

## 2025-07-10 PROCEDURE — 1159F MED LIST DOCD IN RCRD: CPT | Performed by: UROLOGY

## 2025-07-10 PROCEDURE — G2211 COMPLEX E/M VISIT ADD ON: HCPCS | Performed by: UROLOGY

## 2025-07-11 DIAGNOSIS — Z00.00 HEALTH CARE MAINTENANCE: ICD-10-CM

## 2025-07-11 DIAGNOSIS — I10 PRIMARY HYPERTENSION: ICD-10-CM

## 2025-07-11 RX ORDER — METOPROLOL TARTRATE 25 MG/1
25 TABLET, FILM COATED ORAL 2 TIMES DAILY
Qty: 180 TABLET | Refills: 3 | Status: SHIPPED | OUTPATIENT
Start: 2025-07-11

## 2025-07-12 RX ORDER — LEVOTHYROXINE SODIUM 100 UG/1
100 TABLET ORAL DAILY
Qty: 90 TABLET | Refills: 0 | Status: SHIPPED | OUTPATIENT
Start: 2025-07-12

## 2025-08-14 LAB — PSA SERPL-MCNC: 0.86 NG/ML

## 2025-08-15 LAB
ANION GAP SERPL CALCULATED.4IONS-SCNC: 11 MMOL/L (CALC) (ref 7–17)
BUN SERPL-MCNC: 24 MG/DL (ref 7–25)
BUN/CREAT SERPL: 15 (CALC) (ref 6–22)
CALCIUM SERPL-MCNC: 9.3 MG/DL (ref 8.6–10.3)
CHLORIDE SERPL-SCNC: 106 MMOL/L (ref 98–110)
CO2 SERPL-SCNC: 26 MMOL/L (ref 20–32)
CREAT SERPL-MCNC: 1.65 MG/DL (ref 0.7–1.28)
EGFRCR SERPLBLD CKD-EPI 2021: 44 ML/MIN/1.73M2
GLUCOSE SERPL-MCNC: 120 MG/DL (ref 65–99)
POTASSIUM SERPL-SCNC: 4.3 MMOL/L (ref 3.5–5.3)
SODIUM SERPL-SCNC: 143 MMOL/L (ref 135–146)

## 2025-08-20 ENCOUNTER — OFFICE VISIT (OUTPATIENT)
Dept: PRIMARY CARE | Facility: CLINIC | Age: 70
End: 2025-08-20
Payer: MEDICARE

## 2025-08-21 ENCOUNTER — TELEMEDICINE (OUTPATIENT)
Dept: UROLOGY | Facility: HOSPITAL | Age: 70
End: 2025-08-21
Payer: MEDICARE

## 2025-08-21 DIAGNOSIS — R89.7 ABNORMAL PATHOLOGY REPORT FROM PROSTATE NEEDLE BIOPSY: Primary | ICD-10-CM

## 2025-08-21 DIAGNOSIS — N18.31 STAGE 3A CHRONIC KIDNEY DISEASE (MULTI): ICD-10-CM

## 2025-08-21 PROCEDURE — 99214 OFFICE O/P EST MOD 30 MIN: CPT | Performed by: UROLOGY

## 2025-08-21 PROCEDURE — G2211 COMPLEX E/M VISIT ADD ON: HCPCS | Performed by: UROLOGY

## (undated) DEVICE — EVACUATOR, UROVAC

## (undated) DEVICE — LOOP, BIPOLAR CUTTING, 24/26 FR, F/URO, 0.35MM, STERILE

## (undated) DEVICE — Device

## (undated) DEVICE — TOWEL, SURGICAL, NEURO, O/R, 16 X 26, BLUE, STERILE

## (undated) DEVICE — CATHETER, LASER URETERAL, 7.1FR, 40CM

## (undated) DEVICE — DRESSING, NON-ADHERENT, TELFA, OUCHLESS, 3 X 8 IN, STERILE

## (undated) DEVICE — SYRINGE, 50 CC, LUER LOCK

## (undated) DEVICE — TUBING, SUCTION, CONNECTING, STERILE 0.25 X 120 IN., LF

## (undated) DEVICE — BAG, DRAINAGE, ANTI-REFLUX CHAMBER, 2000ML

## (undated) DEVICE — BLADE, ROTATION MORCELLATOR, 4.8MM X 385MM, PIRHANA, DISPOSABLE

## (undated) DEVICE — COLLECTION BAG, FLUID, NON-STERILE

## (undated) DEVICE — IRRIGATION SET, CYSTOSCOPY, TURP, Y, CONTINUOUS, 81 IN

## (undated) DEVICE — CATHETER, URETHRAL, FOLEY, 3 WAY, BARDEX IC, 22 FR, 30 CC, SILVER LATEX

## (undated) DEVICE — BASIN SET, SINGLE

## (undated) DEVICE — TUBING, SUCTION, CONNECTING, NON-CONDUCTIVE, SURE GRIP CONNECTORS, 3/16 IN X 10 FT

## (undated) DEVICE — PLUG, CATHETER

## (undated) DEVICE — SOLUTION, IRRIGATION, USP, SODIUM CHLORIDE 0.9%, 3000 ML, BAG

## (undated) DEVICE — TOWEL PACK, STERILE, 4/PACK, BLUE

## (undated) DEVICE — SYRINGE, 60 CC, IRRIGATION, PISTON, CATH TIP, W/LUER ADAPTER,DISP

## (undated) DEVICE — TUBING, MORCELLATOR PUMP, DISPOSABLE